# Patient Record
Sex: MALE | Race: WHITE | HISPANIC OR LATINO | Employment: FULL TIME | URBAN - METROPOLITAN AREA
[De-identification: names, ages, dates, MRNs, and addresses within clinical notes are randomized per-mention and may not be internally consistent; named-entity substitution may affect disease eponyms.]

---

## 2017-12-13 ENCOUNTER — CLINICAL SUPPORT (OUTPATIENT)
Dept: URGENT CARE | Facility: CLINIC | Age: 62
End: 2017-12-13

## 2017-12-13 DIAGNOSIS — Z02.83 ENCOUNTER FOR DRUG SCREENING: Primary | ICD-10-CM

## 2017-12-13 PROCEDURE — 80305 DRUG TEST PRSMV DIR OPT OBS: CPT | Mod: S$GLB,,, | Performed by: PREVENTIVE MEDICINE

## 2018-02-04 ENCOUNTER — NURSE TRIAGE (OUTPATIENT)
Dept: ADMINISTRATIVE | Facility: CLINIC | Age: 63
End: 2018-02-04

## 2018-02-04 PROCEDURE — 99283 EMERGENCY DEPT VISIT LOW MDM: CPT

## 2018-02-05 ENCOUNTER — HOSPITAL ENCOUNTER (EMERGENCY)
Facility: HOSPITAL | Age: 63
Discharge: HOME | End: 2018-02-05
Attending: EMERGENCY MEDICINE

## 2018-02-05 VITALS
OXYGEN SATURATION: 96 % | WEIGHT: 150 LBS | SYSTOLIC BLOOD PRESSURE: 130 MMHG | TEMPERATURE: 98 F | RESPIRATION RATE: 20 BRPM | HEIGHT: 66 IN | HEART RATE: 92 BPM | DIASTOLIC BLOOD PRESSURE: 79 MMHG | BODY MASS INDEX: 24.11 KG/M2

## 2018-02-05 DIAGNOSIS — M77.12 LATERAL EPICONDYLITIS OF LEFT ELBOW: ICD-10-CM

## 2018-02-05 DIAGNOSIS — B34.9 VIRAL SYNDROME: Primary | ICD-10-CM

## 2018-02-05 DIAGNOSIS — M54.12 CERVICAL RADICULOPATHY: ICD-10-CM

## 2018-02-05 LAB
FLUAV AG SPEC QL IA: NEGATIVE
FLUBV AG SPEC QL IA: NEGATIVE
SPECIMEN SOURCE: NORMAL

## 2018-02-05 PROCEDURE — 25000003 PHARM REV CODE 250: Performed by: EMERGENCY MEDICINE

## 2018-02-05 PROCEDURE — 87400 INFLUENZA A/B EACH AG IA: CPT

## 2018-02-05 RX ORDER — KETOROLAC TROMETHAMINE 10 MG/1
10 TABLET, FILM COATED ORAL
Status: COMPLETED | OUTPATIENT
Start: 2018-02-05 | End: 2018-02-05

## 2018-02-05 RX ORDER — CYCLOBENZAPRINE HCL 10 MG
10 TABLET ORAL
Status: COMPLETED | OUTPATIENT
Start: 2018-02-05 | End: 2018-02-05

## 2018-02-05 RX ORDER — CYCLOBENZAPRINE HCL 10 MG
10 TABLET ORAL 3 TIMES DAILY PRN
Qty: 15 TABLET | Refills: 0 | Status: SHIPPED | OUTPATIENT
Start: 2018-02-05 | End: 2018-02-10

## 2018-02-05 RX ORDER — ACETAMINOPHEN 500 MG
1000 TABLET ORAL
Status: COMPLETED | OUTPATIENT
Start: 2018-02-05 | End: 2018-02-05

## 2018-02-05 RX ADMIN — CYCLOBENZAPRINE HYDROCHLORIDE 10 MG: 10 TABLET, FILM COATED ORAL at 01:02

## 2018-02-05 RX ADMIN — ACETAMINOPHEN 1000 MG: 500 TABLET ORAL at 01:02

## 2018-02-05 RX ADMIN — KETOROLAC TROMETHAMINE 10 MG: 10 TABLET, FILM COATED ORAL at 01:02

## 2018-02-05 NOTE — TELEPHONE ENCOUNTER
"  Reason for Disposition   [1] Sinus pain (not just congestion) AND [2] fever    Answer Assessment - Initial Assessment Questions  1. LOCATION: "Where does it hurt?"       Around nose/cheeks and right ear  2. ONSET: "When did the sinus pain start?"  (e.g., hours, days)       A few days  3. SEVERITY: "How bad is the pain?"   (Scale 1-10; mild, moderate or severe)    - MILD (1-3): doesn't interfere with normal activities     - MODERATE (4-7): interferes with normal activities (e.g., work or school) or awakens from sleep    - SEVERE (8-10): excruciating pain and patient unable to do any normal activities         Mild to moderate  4. RECURRENT SYMPTOM: "Have you ever had sinus problems before?" If so, ask: "When was the last time?" and "What happened that time?"       Has hx of sinus infectikons  5. NASAL CONGESTION: "Is the nose blocked?" If so, ask, "Can you open it or must you breathe through the mouth?"      mild  6. NASAL DISCHARGE: "Do you have discharge from your nose?" If so ask, "What color?"      No -when he clears throat has green mucous production  7. FEVER: "Do you have a fever?" If so, ask: "What is it, how was it measured, and when did it start?"      Subjective-Feels hot/cold and then chills.  8. OTHER SYMPTOMS: "Do you have any other symptoms?" (e.g., sore throat, cough, earache, difficulty breathing)      Fatigue - noted what rash on chest/neck about 1630/1700 today- small red slightly raised bumps -no itching, has had feeling of pressure at temples for 4 or 5 days   9. PREGNANCY: "Is there any chance you are pregnant?" "When was your last menstrual period?"      N/a  Above sx's worsening this week    Protocols used: ST SINUS PAIN AND CONGESTION-A-AH    "

## 2018-02-05 NOTE — ED PROVIDER NOTES
Encounter Date: 2/4/2018    SCRIBE #1 NOTE: I, Abdi Maggy, am scribing for, and in the presence of,  Chepe Yadav MD. I have scribed the entire note.       History     Chief Complaint   Patient presents with    flu like symptoms     fever, chills, headache, sinus congestion, ear pain since yesterday. pt. also has macular/papular rash to upper chest and back x1 day. green sputum     This is a 63 y.o. male who has a history of HTN, GERD  The patient presents to the Emergency Department with myalgias and cough.   Symptoms are associated with fever, chills, rash  Pt also complains of left elbow pain radiating into the wrist  Pt denies nausea/vomtiing.   Symptoms are aggravated by movement  Symptoms are relieved by nothing.     Patient has no prior history of similar symptoms.   Pt has a past surgical history that includes Appendectomy.            Review of patient's allergies indicates:   Allergen Reactions    Vicodin [hydrocodone-acetaminophen]      Past Medical History:   Diagnosis Date    GERD (gastroesophageal reflux disease)     Hypertension     Prostate disease      Past Surgical History:   Procedure Laterality Date    APPENDECTOMY       No family history on file.  Social History   Substance Use Topics    Smoking status: Never Smoker    Smokeless tobacco: Not on file    Alcohol use Yes      Comment: occassional     Review of Systems   Constitutional: Positive for chills and fever.   HENT: Negative for sore throat.    Respiratory: Positive for cough. Negative for shortness of breath.    Cardiovascular: Negative for chest pain.   Gastrointestinal: Negative for nausea and vomiting.   Genitourinary: Negative for dysuria.   Musculoskeletal: Positive for arthralgias and myalgias. Negative for back pain.   Skin: Positive for rash.   Neurological: Negative for weakness.   Hematological: Does not bruise/bleed easily.       Physical Exam     Initial Vitals [02/04/18 2118]   BP Pulse Resp Temp SpO2   (!) 143/73 (!)  116 20 (!) 100.8 °F (38.2 °C) 97 %      MAP       96.33         Physical Exam    Nursing note and vitals reviewed.  Constitutional: He appears well-developed and well-nourished. He is not diaphoretic. No distress.   HENT:   Head: Normocephalic and atraumatic.   Mouth/Throat: Oropharynx is clear and moist.   Eyes: Conjunctivae are normal. Pupils are equal, round, and reactive to light.   Neck: Normal range of motion. Neck supple.   Cardiovascular: Normal rate, regular rhythm, normal heart sounds and intact distal pulses.   Pulmonary/Chest: Breath sounds normal. No respiratory distress. He has no wheezes. He has no rhonchi. He has no rales.   Abdominal: Soft. Bowel sounds are normal. He exhibits no distension. There is no tenderness.   Musculoskeletal: Normal range of motion. He exhibits no edema.   Tenderness over lateral epicondyle, tenderness over left trapezius muscle,   Lymphadenopathy:     He has no cervical adenopathy.   Neurological: He is alert and oriented to person, place, and time. He has normal strength.   Ain, PIN and ulnar nerves are intact,  strength is normal and equal,   Skin: Skin is warm and dry.   maculopapular rash to chest   Psychiatric: He has a normal mood and affect. Thought content normal.         ED Course   Procedures  Labs Reviewed   INFLUENZA A AND B ANTIGEN             Medical Decision Making:   Initial Assessment:   This is an emergent evaluation of a 63 y.o.male patient with presentation of rash, flu like symptoms, left elbow pain and radiating pain from the left neck.   Initial differentials include but are not limited to: lateral epicondylitis, strain, sprain, influenza, flu like illness, viral URI.  Plan: check rapid influenza. Give Toradol for pain. Discharge to follow with primary doctor.      ED Management:  Flu test is negative. Symptoms consistent with lateral epicondylitis and cervical radiculopathy.   Clinical impression and plan discussed with patient.    Pt is to call  for follow up with PCP in 7 days.  Pt to return to the ED for any new or concerning symptoms.  Aftercare instructions and return precautions provided to patient.   Pt expressed understanding and agrees with plan.                         ED Course      Clinical Impression:     1. Viral syndrome    2. Cervical radiculopathy    3. Lateral epicondylitis of left elbow          Disposition:   Disposition: Discharged  Condition: Stable       Scribe Attestation I, Dr. Chepe Yadav, personally performed the services described in this documentation.   All medical record entries made by the scribe were at my direction and in my presence.   I have reviewed the chart and agree that the record is accurate and complete.   Chepe Yadav MD.                    Chepe Yadav MD  02/05/18 3658

## 2018-12-13 ENCOUNTER — CLINICAL SUPPORT (OUTPATIENT)
Dept: URGENT CARE | Facility: CLINIC | Age: 63
End: 2018-12-13

## 2018-12-13 PROCEDURE — 99499 UNLISTED E&M SERVICE: CPT | Mod: S$GLB,,, | Performed by: NURSE PRACTITIONER

## 2019-11-24 ENCOUNTER — HOSPITAL ENCOUNTER (EMERGENCY)
Facility: HOSPITAL | Age: 64
Discharge: HOME | End: 2019-11-24
Attending: EMERGENCY MEDICINE
Payer: MEDICAID

## 2019-11-24 VITALS
DIASTOLIC BLOOD PRESSURE: 78 MMHG | SYSTOLIC BLOOD PRESSURE: 153 MMHG | OXYGEN SATURATION: 100 % | HEIGHT: 66 IN | WEIGHT: 145 LBS | TEMPERATURE: 98 F | HEART RATE: 77 BPM | BODY MASS INDEX: 23.3 KG/M2

## 2019-11-24 DIAGNOSIS — S62.639B OPEN FRACTURE OF TUFT OF DISTAL PHALANX OF FINGER: ICD-10-CM

## 2019-11-24 DIAGNOSIS — S61.315A LACERATION OF LEFT RING FINGER WITHOUT FOREIGN BODY WITH DAMAGE TO NAIL, INITIAL ENCOUNTER: Primary | ICD-10-CM

## 2019-11-24 PROCEDURE — 90715 TDAP VACCINE 7 YRS/> IM: CPT | Performed by: EMERGENCY MEDICINE

## 2019-11-24 PROCEDURE — 90471 IMMUNIZATION ADMIN: CPT | Performed by: EMERGENCY MEDICINE

## 2019-11-24 PROCEDURE — 63600175 PHARM REV CODE 636 W HCPCS: Performed by: EMERGENCY MEDICINE

## 2019-11-24 PROCEDURE — 12001 RPR S/N/AX/GEN/TRNK 2.5CM/<: CPT | Mod: F3

## 2019-11-24 PROCEDURE — 99284 EMERGENCY DEPT VISIT MOD MDM: CPT | Mod: 25

## 2019-11-24 PROCEDURE — 96365 THER/PROPH/DIAG IV INF INIT: CPT

## 2019-11-24 PROCEDURE — 25000003 PHARM REV CODE 250: Performed by: EMERGENCY MEDICINE

## 2019-11-24 PROCEDURE — 96372 THER/PROPH/DIAG INJ SC/IM: CPT | Mod: 59

## 2019-11-24 RX ORDER — CEFAZOLIN SODIUM 2 G/50ML
2 SOLUTION INTRAVENOUS
Status: COMPLETED | OUTPATIENT
Start: 2019-11-24 | End: 2019-11-24

## 2019-11-24 RX ORDER — MELOXICAM 7.5 MG/1
7.5 TABLET ORAL DAILY
Qty: 7 TABLET | Refills: 0 | Status: SHIPPED | OUTPATIENT
Start: 2019-11-24

## 2019-11-24 RX ORDER — LIDOCAINE HYDROCHLORIDE 10 MG/ML
10 INJECTION INFILTRATION; PERINEURAL
Status: COMPLETED | OUTPATIENT
Start: 2019-11-24 | End: 2019-11-24

## 2019-11-24 RX ORDER — OMEPRAZOLE 20 MG/1
20 CAPSULE, DELAYED RELEASE ORAL DAILY
Qty: 30 CAPSULE | Refills: 0 | Status: SHIPPED | OUTPATIENT
Start: 2019-11-24 | End: 2019-12-24

## 2019-11-24 RX ORDER — KETOROLAC TROMETHAMINE 30 MG/ML
30 INJECTION, SOLUTION INTRAMUSCULAR; INTRAVENOUS
Status: COMPLETED | OUTPATIENT
Start: 2019-11-24 | End: 2019-11-24

## 2019-11-24 RX ORDER — CEPHALEXIN 500 MG/1
500 CAPSULE ORAL 4 TIMES DAILY
Qty: 24 CAPSULE | Refills: 0 | Status: SHIPPED | OUTPATIENT
Start: 2019-11-25 | End: 2019-12-01

## 2019-11-24 RX ORDER — MORPHINE SULFATE 15 MG/1
15 TABLET ORAL EVERY 12 HOURS PRN
Qty: 6 TABLET | Refills: 0 | Status: SHIPPED | OUTPATIENT
Start: 2019-11-24

## 2019-11-24 RX ADMIN — LIDOCAINE HYDROCHLORIDE 10 ML: 10 INJECTION, SOLUTION INFILTRATION; PERINEURAL at 05:11

## 2019-11-24 RX ADMIN — KETOROLAC TROMETHAMINE 30 MG: 30 INJECTION, SOLUTION INTRAMUSCULAR at 05:11

## 2019-11-24 RX ADMIN — CEFAZOLIN SODIUM 2 G: 2 SOLUTION INTRAVENOUS at 06:11

## 2019-11-24 RX ADMIN — CLOSTRIDIUM TETANI TOXOID ANTIGEN (FORMALDEHYDE INACTIVATED), CORYNEBACTERIUM DIPHTHERIAE TOXOID ANTIGEN (FORMALDEHYDE INACTIVATED), BORDETELLA PERTUSSIS TOXOID ANTIGEN (GLUTARALDEHYDE INACTIVATED), BORDETELLA PERTUSSIS FILAMENTOUS HEMAGGLUTININ ANTIGEN (FORMALDEHYDE INACTIVATED), BORDETELLA PERTUSSIS PERTACTIN ANTIGEN, AND BORDETELLA PERTUSSIS FIMBRIAE 2/3 ANTIGEN 0.5 ML: 5; 2; 2.5; 5; 3; 5 INJECTION, SUSPENSION INTRAMUSCULAR at 05:11

## 2019-11-24 NOTE — ED NOTES
Patient present to ER with left middle and ring finger injury from being smashed by door today, left hand in bag of ice, 10/10 on pain scale

## 2019-11-24 NOTE — ED PROVIDER NOTES
Encounter Date: 11/24/2019    SCRIBE #1 NOTE: I, Franci Rivera, am scribing for, and in the presence of,  Dr. Catalan. I have scribed the entire note.       History     Chief Complaint   Patient presents with    Laceration     Pt had his hand caught in door- avulsions to left middle and ring fingers. Bleeding controlled.      Gunner Farr is a 64 y.o. male who  has a past medical history of GERD (gastroesophageal reflux disease), Hypertension, and Prostate disease.    The patient presents to the ED due to crushed fingers to the left hand. He reports onset of symptoms was just prior to arrival in the ED. He notes his finger were crushed by an acordion door at work. The patient sustained a wound to the left ring finger with bleeding that was controlled. He denies any numbness, tingling or weakness to the left hand. The patient is right hand dominant. His last Tetanus shot was greater 14 years ago.     The history is provided by the patient.     Review of patient's allergies indicates:   Allergen Reactions    Vicodin [hydrocodone-acetaminophen]      Past Medical History:   Diagnosis Date    GERD (gastroesophageal reflux disease)     Hypertension     Prostate disease      Past Surgical History:   Procedure Laterality Date    APPENDECTOMY       No family history on file.  Social History     Tobacco Use    Smoking status: Never Smoker   Substance Use Topics    Alcohol use: Yes     Comment: occassional    Drug use: No     Review of Systems   Constitutional: Negative for chills and fever.   HENT: Negative for congestion, ear pain, rhinorrhea and sore throat.    Respiratory: Negative for cough, shortness of breath and wheezing.    Cardiovascular: Negative for chest pain and palpitations.   Gastrointestinal: Negative for abdominal pain, diarrhea, nausea and vomiting.   Genitourinary: Negative for dysuria and hematuria.   Musculoskeletal: Negative for back pain, myalgias and neck pain.   Skin: Positive for wound  (left ring finger). Negative for rash.   Neurological: Negative for dizziness, weakness, light-headedness and headaches.   Psychiatric/Behavioral: Negative for confusion.       Physical Exam     Initial Vitals [11/24/19 1630]   BP Pulse Resp Temp SpO2   (!) 159/86 104 -- -- 100 %      MAP       --         Physical Exam    Nursing note and vitals reviewed.  Constitutional: He appears well-developed and well-nourished. He is not diaphoretic. No distress.   HENT:   Head: Normocephalic and atraumatic.   Mouth/Throat: Oropharynx is clear and moist.   Eyes: Conjunctivae and EOM are normal.   Neck: Normal range of motion. Neck supple.   Cardiovascular: Intact distal pulses.   Pulmonary/Chest: No respiratory distress.   Musculoskeletal: Normal range of motion. He exhibits tenderness. He exhibits no edema.   LUE; Able to range all fingers. Sensation intact to light touch. Ring finger with nailbed avulsion and subungual hematoma. Laceration to finger as shown.   Lymphadenopathy:     He has no cervical adenopathy.   Neurological: He is alert and oriented to person, place, and time. He has normal strength.   Skin: Skin is warm and dry. Capillary refill takes less than 2 seconds. No rash noted.                 ED Course   Lac Repair  Date/Time: 11/24/2019 7:00 PM  Performed by: Brandon Catalan Jr., MD  Authorized by: Brandon Catalan Jr., MD   Consent Done: Yes  Consent: Verbal consent obtained.  Risks and benefits: risks, benefits and alternatives were discussed  Consent given by: patient  Body area: upper extremity  Location details: left ring finger  Laceration length: 0.5 cm  Foreign bodies: no foreign bodies  Tendon involvement: none  Nerve involvement: none  Vascular damage: no  Anesthesia: digital block    Anesthesia:  Local anesthesia used: yes  Local Anesthetic: lidocaine 1% without epinephrine  Anesthetic total: 6 mL  Patient sedated: no  Preparation: Patient was prepped and draped in the usual sterile  fashion.  Irrigation solution: saline  Irrigation method: syringe  Amount of cleaning: extensive  Debridement: none  Degree of undermining: none  Skin closure: Ethilon (4-0)  Number of sutures: 3  Technique: simple (interrupted)  Approximation: close  Approximation difficulty: complex  Dressing: non-stick sterile dressing  Patient tolerance: Patient tolerated the procedure well with no immediate complications  Comments: Two sutures placed to laceration on finger tip, one placed through nail         Labs Reviewed - No data to display       Imaging Results          X-Ray Finger 2 or More Views Left (Final result)  Result time 11/24/19 17:18:35    Final result by Alec Wood MD (11/24/19 17:18:35)                 Impression:      Acute fracture of the 4th finger distal phalanx.      Electronically signed by: Alec Wood MD  Date:    11/24/2019  Time:    17:18             Narrative:    EXAMINATION:  XR FINGER 2 OR MORE VIEWS LEFT    CLINICAL HISTORY:  crush injury;    COMPARISON:  None    FINDINGS:  Acute mild displaced fracture is seen involving the distal tuft of the 4th finger distal phalanx.  No additional fractures or dislocation seen.                                 Medical Decision Making:   Differential Diagnosis:   Differential Diagnosis includes, but is not limited to:  Open fracture, vascular injury, tendon/ligament injury, nerve injury, retained foreign body, superficial laceration, abrasion.    Clinical Tests:   Radiological Study: Ordered and Reviewed  ED Management:  64 year old male with finger tip laceration and associated tuft fracture.     Given Ancef, tdap in ED    Will discharge on keflex for 7 days total of antibiotics    Will discharge with pain medication, patient reports allergy to hydrocodone, will prescribe morphine sulfate    Discussed with Dr. Moreno who recommended suturing through nail plate for increased stabilization. He will follow up with patient tomorrow.    Return  precautions for worsening pain, numbness, swelling, fever, or any other concerns.                    ED Course as of Nov 24 1859   Sun Nov 24, 2019   1804 Dr. Moreno has reviewed images recommends suturing and discharging on prophylactic antibiotics    [SP]      ED Course User Index  [SP] Franci Rivera                Clinical Impression:     1. Laceration of left ring finger without foreign body with damage to nail, initial encounter    2. Open fracture of tuft of distal phalanx of finger           Scribe attestation I, Brandon Catalan,  personally performed the services described in this documentation. All medical record entries made by the scribe were at my direction and in my presence.  I have reviewed the chart and agree that the record reflects my personal performance and is accurate and complete. Brandon Catalan M.D. 11:17 AM11/26/2019      Portions of this note were dictated using voice recognition software and may contain dictation related errors in spelling/grammar/syntax not found on text review                   Brandon Catalan Jr., MD  11/26/19 0361

## 2019-11-24 NOTE — ED NOTES
APPEARANCE: Alert, oriented and in no acute distress.  CARDIAC: Normal rate and rhythm, no murmur heard.   PERIPHERAL VASCULAR: peripheral pulses present. Normal cap refill. No edema. Warm to touch.    RESPIRATORY:Normal rate and effort, breath sounds clear bilaterally throughout chest. Respirations are equal and unlabored no obvious signs of distress.  GASTRO: soft, bowel sounds normal, no tenderness, no abdominal distention.  MUSC: Full ROM. No bony tenderness or soft tissue tenderness. No obvious deformity.  SKIN: Skin is warm and dry, normal skin turgor, mucous membranes moist. Laceration to left tip ring finger  NEURO: 5/5 strength major flexors/extensors bilaterally. Sensory intact to light touch bilaterally. [place] coma scale: eyes open spontaneously-4, oriented & converses-5, obeys commands-6. No neurological abnormalities.   MENTAL STATUS: awake, alert and aware of environment.  EYE: PERRL, both eyes: pupils brisk and reactive to light. Normal size.  ENT: EARS: no obvious drainage. NOSE: no active bleeding.

## 2019-11-25 ENCOUNTER — OFFICE VISIT (OUTPATIENT)
Dept: ORTHOPEDICS | Facility: CLINIC | Age: 64
End: 2019-11-25
Payer: MEDICAID

## 2019-11-25 ENCOUNTER — TELEPHONE (OUTPATIENT)
Dept: ORTHOPEDICS | Facility: CLINIC | Age: 64
End: 2019-11-25

## 2019-11-25 VITALS — HEIGHT: 66 IN | WEIGHT: 145 LBS | BODY MASS INDEX: 23.3 KG/M2

## 2019-11-25 DIAGNOSIS — S62.665D: ICD-10-CM

## 2019-11-25 PROCEDURE — 99999 PR PBB SHADOW E&M-EST. PATIENT-LVL II: ICD-10-PCS | Mod: PBBFAC,,, | Performed by: ORTHOPAEDIC SURGERY

## 2019-11-25 PROCEDURE — 99203 PR OFFICE/OUTPT VISIT, NEW, LEVL III, 30-44 MIN: ICD-10-PCS | Mod: S$PBB,,, | Performed by: ORTHOPAEDIC SURGERY

## 2019-11-25 PROCEDURE — 99999 PR PBB SHADOW E&M-EST. PATIENT-LVL II: CPT | Mod: PBBFAC,,, | Performed by: ORTHOPAEDIC SURGERY

## 2019-11-25 PROCEDURE — 99212 OFFICE O/P EST SF 10 MIN: CPT | Mod: PBBFAC,PN | Performed by: ORTHOPAEDIC SURGERY

## 2019-11-25 PROCEDURE — 99203 OFFICE O/P NEW LOW 30 MIN: CPT | Mod: S$PBB,,, | Performed by: ORTHOPAEDIC SURGERY

## 2019-11-25 RX ORDER — TRAMADOL HYDROCHLORIDE 50 MG/1
50 TABLET ORAL EVERY 4 HOURS PRN
Qty: 30 TABLET | Refills: 0 | Status: SHIPPED | OUTPATIENT
Start: 2019-11-25

## 2019-11-25 RX ORDER — TERAZOSIN 2 MG/1
2 CAPSULE ORAL NIGHTLY
COMMUNITY
End: 2023-06-26

## 2019-11-25 NOTE — DISCHARGE INSTRUCTIONS
[facility]  Phone: [phone]    If you would like to follow up with the [facility] for further care of your fracture, please call the [facility] Scheduling Department at [phone] during business hours. Please let the  know you need a fracture follow-up appointment with Orthopedics, and you will be scheduled in the Orthopedic Clinic. Expect your appointment to be scheduled within 1-2 weeks.

## 2019-11-25 NOTE — PROGRESS NOTES
Subjective:      Patient ID: Gunner Farr is a 64 y.o. male.    Chief Complaint: Consult and Finger Injury (Left ring )      HPI  Gunner Farr is a  64 y.o. male presenting today for left ring finger injury.  There was a history of trauma.  Onset of symptoms began yesterday when he smashed his left ring finger.  He was seen in the emergency room and [place] where he was noted to have an open tuft fracture repaired with sutures is here today in follow-up.      Review of patient's allergies indicates:   Allergen Reactions    Hydrocodone-acetaminophen Shortness Of Breath     Shortness of breath^  Shortness of breath^      Caffeine      Anxiety^difficulty breathing  Anxiety^difficulty breathing           Current Outpatient Medications   Medication Sig Dispense Refill    cephALEXin (KEFLEX) 500 MG capsule Take 1 capsule (500 mg total) by mouth 4 (four) times daily. for 6 days 24 capsule 0    fluticasone (FLONASE) 50 mcg/actuation nasal spray 1 spray by Each Nare route 2 (two) times daily as needed for Rhinitis. 15 g 0    ibuprofen (ADVIL,MOTRIN) 600 MG tablet Take 1 tablet (600 mg total) by mouth every 6 (six) hours as needed for Pain. 20 tablet 0    meloxicam (MOBIC) 7.5 MG tablet Take 1 tablet (7.5 mg total) by mouth once daily. 7 tablet 0    morphine (MSIR) 15 MG tablet Take 1 tablet (15 mg total) by mouth every 12 (twelve) hours as needed for Pain (break through pain). 6 tablet 0    omeprazole (PRILOSEC) 20 MG capsule Take 1 capsule (20 mg total) by mouth once daily. 30 capsule 0    terazosin (HYTRIN) 2 MG capsule Take 2 mg by mouth every evening.      atenolol (TENORMIN) 50 MG tablet Take 50 mg by mouth once daily.      diazepam (VALIUM) 5 MG tablet Take 1 tablet (5 mg total) by mouth every 8 (eight) hours. 15 tablet 0    tamsulosin (FLOMAX) 0.4 mg Cp24 Take 0.4 mg by mouth once daily.       No current facility-administered medications for this visit.        Past Medical History:   Diagnosis Date     "GERD (gastroesophageal reflux disease)     Hypertension     Prostate disease        Past Surgical History:   Procedure Laterality Date    APPENDECTOMY         Review of Systems:  ROS    OBJECTIVE:     PHYSICAL EXAM:  Height: 5' 6" (167.6 cm) Weight: 65.8 kg (145 lb)  Vitals:    11/25/19 1112   Weight: 65.8 kg (145 lb)   Height: 5' 6" (1.676 m)   PainSc: 10-Worst pain ever     Well developed, well nourished male in no acute distress  Alert and oriented x 3  HEENT- Normal exam  Lungs- Clear to auscultation  Heart- Regular rate and rhythm  Abdomen- Soft nontender  Extremity exam- examination left ring finger after removal of the bandage the tip looks good the nail plate is intact sutures in place slight swelling no significant deformity    RADIOGRAPHS:  AP lateral x-rays reviewed left ring finger demonstrate a minimally displaced tuft fracture at the tip  Comments: I have personally reviewed the imaging and I agree with the above radiologist's report.    ASSESSMENT/PLAN:     IMPRESSION:  Open tuft fracture crush injury left ring finger    PLAN:  Patient was instructed appropriate wound care including daily dressing changes  He should probably avoid soap and water for this week follow-up 1 week for recheck and suture removal  Continue antibiotics and pain medicine from the ER       - We talked at length about the anatomy and pathophysiology of No diagnosis found.        Disclaimer: This note has been generated using voice-recognition software. There may be typographical errors that have been missed during proof-reading.  "

## 2019-11-25 NOTE — TELEPHONE ENCOUNTER
----- Message from Francine Maradiaga sent at 11/25/2019  1:28 PM CST -----  Contact: self, [phone]  Patient states he needs a letter stating he can continue to work on light duty with one hand faxed to [phone].

## 2019-11-25 NOTE — TELEPHONE ENCOUNTER
----- Message from Brandon Moreno Jr., MD sent at 11/24/2019  9:50 PM CST -----  Please call this patient for appt with me or Faiza this week  Thx

## 2019-11-25 NOTE — TELEPHONE ENCOUNTER
----- Message from Jr Matthews sent at 11/25/2019  2:36 PM CST -----  Contact: Patient @ [phone]  Patient calling to get an update on the return to work with light duties notice, pls call

## 2019-12-02 ENCOUNTER — TELEPHONE (OUTPATIENT)
Dept: ORTHOPEDICS | Facility: CLINIC | Age: 64
End: 2019-12-02

## 2019-12-02 ENCOUNTER — OFFICE VISIT (OUTPATIENT)
Dept: ORTHOPEDICS | Facility: CLINIC | Age: 64
End: 2019-12-02
Payer: MEDICAID

## 2019-12-02 VITALS — HEIGHT: 66 IN | WEIGHT: 145 LBS | BODY MASS INDEX: 23.3 KG/M2

## 2019-12-02 DIAGNOSIS — S62.665D: Primary | ICD-10-CM

## 2019-12-02 PROCEDURE — 99999 PR PBB SHADOW E&M-EST. PATIENT-LVL III: CPT | Mod: PBBFAC,,, | Performed by: ORTHOPAEDIC SURGERY

## 2019-12-02 PROCEDURE — 99999 PR PBB SHADOW E&M-EST. PATIENT-LVL III: ICD-10-PCS | Mod: PBBFAC,,, | Performed by: ORTHOPAEDIC SURGERY

## 2019-12-02 PROCEDURE — 99213 PR OFFICE/OUTPT VISIT, EST, LEVL III, 20-29 MIN: ICD-10-PCS | Mod: S$PBB,,, | Performed by: ORTHOPAEDIC SURGERY

## 2019-12-02 PROCEDURE — 99213 OFFICE O/P EST LOW 20 MIN: CPT | Mod: S$PBB,,, | Performed by: ORTHOPAEDIC SURGERY

## 2019-12-02 PROCEDURE — 99213 OFFICE O/P EST LOW 20 MIN: CPT | Mod: PBBFAC,PN | Performed by: ORTHOPAEDIC SURGERY

## 2019-12-02 RX ORDER — TRAMADOL HYDROCHLORIDE 50 MG/1
50 TABLET ORAL EVERY 6 HOURS PRN
Qty: 30 TABLET | Refills: 0 | Status: SHIPPED | OUTPATIENT
Start: 2019-12-02 | End: 2019-12-12

## 2019-12-02 NOTE — TELEPHONE ENCOUNTER
Left message for Rachelle to return call regarding if this is workers comp she needs to contact them workers comp office at [phone].

## 2019-12-02 NOTE — TELEPHONE ENCOUNTER
----- Message from Mary Grace Arias sent at 12/2/2019 12:42 PM CST -----  Contact: Rachelle @ [facility] [phone]  Rachelle is requesting office visit notes from 11/25 appt, be sent to FAX# [phone]. Please advise.

## 2019-12-17 ENCOUNTER — TELEPHONE (OUTPATIENT)
Dept: ORTHOPEDICS | Facility: CLINIC | Age: 64
End: 2019-12-17

## 2019-12-17 NOTE — TELEPHONE ENCOUNTER
----- Message from Yao Patton sent at 12/17/2019 10:58 AM CST -----  Contact: [phone]/self   Patient calling to speak with you about rescheduling his appointment.  Patient also requesting a letter saying he has to be on light duty until his next appointment. Patient states he need the letter before 1pm     Please call back to assist at [phone]

## 2019-12-19 ENCOUNTER — TELEPHONE (OUTPATIENT)
Dept: ORTHOPEDICS | Facility: CLINIC | Age: 64
End: 2019-12-19

## 2019-12-19 NOTE — TELEPHONE ENCOUNTER
----- Message from Lissa Francis sent at 12/19/2019 11:25 AM CST -----  Contact: [phone]  Patient needs a letter faxed to his employer about his missed appt and his new appt. Send to [phone]. He is requesting to speak with Maribel. Please advise.

## 2019-12-19 NOTE — TELEPHONE ENCOUNTER
----- Message from Lissa Francis sent at 12/19/2019 11:25 AM CST -----  Contact: [phone]  Patient needs a letter faxed to his employer about his missed appt and his new appt. Send to [phone]. He is requesting to speak with Maribel. Please advise.

## 2019-12-19 NOTE — TELEPHONE ENCOUNTER
----- Message from Uriah Branham sent at 12/19/2019 11:41 AM CST -----  Contact: [phone]/ self   Patient called in returning your call. Please advise.

## 2020-01-03 ENCOUNTER — TELEPHONE (OUTPATIENT)
Dept: ORTHOPEDICS | Facility: CLINIC | Age: 65
End: 2020-01-03

## 2020-01-03 DIAGNOSIS — S62.605B: Primary | ICD-10-CM

## 2020-01-07 ENCOUNTER — HOSPITAL ENCOUNTER (OUTPATIENT)
Dept: RADIOLOGY | Facility: HOSPITAL | Age: 65
Discharge: HOME | End: 2020-01-07
Attending: ORTHOPAEDIC SURGERY
Payer: MEDICAID

## 2020-01-07 ENCOUNTER — OFFICE VISIT (OUTPATIENT)
Dept: ORTHOPEDICS | Facility: CLINIC | Age: 65
End: 2020-01-07
Payer: MEDICAID

## 2020-01-07 DIAGNOSIS — S62.668D: Primary | ICD-10-CM

## 2020-01-07 DIAGNOSIS — S62.605B: ICD-10-CM

## 2020-01-07 PROCEDURE — 99999 PR PBB SHADOW E&M-EST. PATIENT-LVL III: ICD-10-PCS | Mod: PBBFAC,,, | Performed by: ORTHOPAEDIC SURGERY

## 2020-01-07 PROCEDURE — 99213 OFFICE O/P EST LOW 20 MIN: CPT | Mod: PBBFAC,25,PN | Performed by: ORTHOPAEDIC SURGERY

## 2020-01-07 PROCEDURE — 73120 X-RAY EXAM OF HAND: CPT | Mod: 26,LT,, | Performed by: RADIOLOGY

## 2020-01-07 PROCEDURE — 99213 OFFICE O/P EST LOW 20 MIN: CPT | Mod: S$PBB,,, | Performed by: ORTHOPAEDIC SURGERY

## 2020-01-07 PROCEDURE — 73120 XR HAND 2 VIEW LEFT: ICD-10-PCS | Mod: 26,LT,, | Performed by: RADIOLOGY

## 2020-01-07 PROCEDURE — 99999 PR PBB SHADOW E&M-EST. PATIENT-LVL III: CPT | Mod: PBBFAC,,, | Performed by: ORTHOPAEDIC SURGERY

## 2020-01-07 PROCEDURE — 99213 PR OFFICE/OUTPT VISIT, EST, LEVL III, 20-29 MIN: ICD-10-PCS | Mod: S$PBB,,, | Performed by: ORTHOPAEDIC SURGERY

## 2020-01-07 PROCEDURE — 73120 X-RAY EXAM OF HAND: CPT | Mod: TC,PN,LT

## 2020-01-07 NOTE — PROGRESS NOTES
Subjective:      Patient ID: Gunner Farr is a 65 y.o. male.  Chief Complaint: Finger Pain (left ring finger)      HPI  Gunner Farr is a  65 y.o. male presenting today for follow up of open fracture left ring finger.  He reports that he is improving now about 6 weeks out from injury but he is still reporting some sensitivity and stiffness in the finger.    Review of patient's allergies indicates:   Allergen Reactions    Hydrocodone-acetaminophen Shortness Of Breath     Shortness of breath^  Shortness of breath^      Caffeine      Anxiety^difficulty breathing  Anxiety^difficulty breathing           Current Outpatient Medications   Medication Sig Dispense Refill    atenolol (TENORMIN) 50 MG tablet Take 50 mg by mouth once daily.      fluticasone (FLONASE) 50 mcg/actuation nasal spray 1 spray by Each Nare route 2 (two) times daily as needed for Rhinitis. 15 g 0    ibuprofen (ADVIL,MOTRIN) 600 MG tablet Take 1 tablet (600 mg total) by mouth every 6 (six) hours as needed for Pain. 20 tablet 0    meloxicam (MOBIC) 7.5 MG tablet Take 1 tablet (7.5 mg total) by mouth once daily. 7 tablet 0    morphine (MSIR) 15 MG tablet Take 1 tablet (15 mg total) by mouth every 12 (twelve) hours as needed for Pain (break through pain). 6 tablet 0    tamsulosin (FLOMAX) 0.4 mg Cp24 Take 0.4 mg by mouth once daily.      terazosin (HYTRIN) 2 MG capsule Take 2 mg by mouth every evening.      traMADol (ULTRAM) 50 mg tablet Take 1 tablet (50 mg total) by mouth every 4 (four) hours as needed for Pain. 30 tablet 0    diazepam (VALIUM) 5 MG tablet Take 1 tablet (5 mg total) by mouth every 8 (eight) hours. 15 tablet 0    omeprazole (PRILOSEC) 20 MG capsule Take 1 capsule (20 mg total) by mouth once daily. 30 capsule 0     No current facility-administered medications for this visit.        Past Medical History:   Diagnosis Date    GERD (gastroesophageal reflux disease)     Hypertension     Prostate disease        Past Surgical  History:   Procedure Laterality Date    APPENDECTOMY         OBJECTIVE:   PHYSICAL EXAM:       Vitals:    01/07/20 1048   PainSc:   5     Ortho/SPM Exam  Examination left hand the tip is well healed there is some mild sensitivity slight swelling and range of motion slightly decreased  Tendon function intact    RADIOGRAPHS:  None  Comments: I have personally reviewed the imaging and I agree with the above radiologist's report.    ASSESSMENT/PLAN:     IMPRESSION:  Status post injury left ring finger with slight stiffness    PLAN:  I have ordered some OT left hand to work on range of motion and strengthening  Advil or Motrin by mouth      FOLLOW UP:  3-4 weeks    Disclaimer: This note has been generated using voice-recognition software. There may be typographical errors that have been missed during proof-reading.

## 2020-02-04 ENCOUNTER — OFFICE VISIT (OUTPATIENT)
Dept: ORTHOPEDICS | Facility: CLINIC | Age: 65
End: 2020-02-04

## 2020-02-04 VITALS — BODY MASS INDEX: 23.3 KG/M2 | HEIGHT: 66 IN | WEIGHT: 145 LBS

## 2020-02-04 DIAGNOSIS — S62.665D: Primary | ICD-10-CM

## 2020-02-04 PROCEDURE — 99213 OFFICE O/P EST LOW 20 MIN: CPT | Mod: PBBFAC,PN | Performed by: ORTHOPAEDIC SURGERY

## 2020-02-04 PROCEDURE — 99999 PR PBB SHADOW E&M-EST. PATIENT-LVL III: CPT | Mod: PBBFAC,,, | Performed by: ORTHOPAEDIC SURGERY

## 2020-02-04 PROCEDURE — 99499 UNLISTED E&M SERVICE: CPT | Mod: S$PBB,,, | Performed by: ORTHOPAEDIC SURGERY

## 2020-02-04 PROCEDURE — 99999 PR PBB SHADOW E&M-EST. PATIENT-LVL III: ICD-10-PCS | Mod: PBBFAC,,, | Performed by: ORTHOPAEDIC SURGERY

## 2020-02-04 PROCEDURE — 99499 NO LOS: ICD-10-PCS | Mod: S$PBB,,, | Performed by: ORTHOPAEDIC SURGERY

## 2020-02-04 RX ORDER — METHOCARBAMOL 750 MG/1
500 TABLET, FILM COATED ORAL 4 TIMES DAILY
COMMUNITY

## 2020-02-04 NOTE — PROGRESS NOTES
Subjective:      Patient ID: Gunner Farr is a 65 y.o. male.  Chief Complaint: Follow-up of the Left Hand      HPI  Gunner Farr is a  65 y.o. male presenting today for follow up of open fracture of the left ring finger.  He reports that he is doing well no problems reported he feels ready to be released full duty work without pain.    Review of patient's allergies indicates:   Allergen Reactions    Hydrocodone-acetaminophen Shortness Of Breath     Shortness of breath^  Shortness of breath^      Caffeine      Anxiety^difficulty breathing  Anxiety^difficulty breathing           Current Outpatient Medications   Medication Sig Dispense Refill    atenolol (TENORMIN) 50 MG tablet Take 50 mg by mouth once daily.      fluticasone (FLONASE) 50 mcg/actuation nasal spray 1 spray by Each Nare route 2 (two) times daily as needed for Rhinitis. 15 g 0    ibuprofen (ADVIL,MOTRIN) 600 MG tablet Take 1 tablet (600 mg total) by mouth every 6 (six) hours as needed for Pain. 20 tablet 0    methocarbamol (ROBAXIN) 750 MG Tab Take 500 mg by mouth 4 (four) times daily.      tamsulosin (FLOMAX) 0.4 mg Cp24 Take 0.4 mg by mouth once daily.      terazosin (HYTRIN) 2 MG capsule Take 2 mg by mouth every evening.      diazepam (VALIUM) 5 MG tablet Take 1 tablet (5 mg total) by mouth every 8 (eight) hours. 15 tablet 0    meloxicam (MOBIC) 7.5 MG tablet Take 1 tablet (7.5 mg total) by mouth once daily. (Patient not taking: Reported on 2/4/2020) 7 tablet 0    morphine (MSIR) 15 MG tablet Take 1 tablet (15 mg total) by mouth every 12 (twelve) hours as needed for Pain (break through pain). (Patient not taking: Reported on 2/4/2020) 6 tablet 0    omeprazole (PRILOSEC) 20 MG capsule Take 1 capsule (20 mg total) by mouth once daily. 30 capsule 0    traMADol (ULTRAM) 50 mg tablet Take 1 tablet (50 mg total) by mouth every 4 (four) hours as needed for Pain. (Patient not taking: Reported on 2/4/2020) 30 tablet 0     No current  "facility-administered medications for this visit.        Past Medical History:   Diagnosis Date    GERD (gastroesophageal reflux disease)     Hypertension     Prostate disease        Past Surgical History:   Procedure Laterality Date    APPENDECTOMY         OBJECTIVE:   PHYSICAL EXAM:  Height: 5' 6" (167.6 cm) Weight: 65.8 kg (145 lb)  Vitals:    02/04/20 1111   Weight: 65.8 kg (145 lb)   Height: 5' 6" (1.676 m)   PainSc: 0-No pain     Ortho/SPM Exam  Examination left ring finger the tip looks well-healed no swelling no tenderness  Full range of motion good strength    RADIOGRAPHS:  None  Comments: I have personally reviewed the imaging and I agree with the above radiologist's report.    ASSESSMENT/PLAN:     IMPRESSION:  Status post open fracture left ring finger tip    PLAN:  Return to full activities including full duty work    FOLLOW UP:  As needed only    Disclaimer: This note has been generated using voice-recognition software. There may be typographical errors that have been missed during proof-reading.  "

## 2020-07-02 ENCOUNTER — OFFICE VISIT (OUTPATIENT)
Dept: URGENT CARE | Facility: CLINIC | Age: 65
End: 2020-07-02
Payer: MEDICAID

## 2020-07-02 VITALS
TEMPERATURE: 100 F | BODY MASS INDEX: 24.11 KG/M2 | SYSTOLIC BLOOD PRESSURE: 128 MMHG | RESPIRATION RATE: 16 BRPM | HEIGHT: 66 IN | DIASTOLIC BLOOD PRESSURE: 77 MMHG | OXYGEN SATURATION: 99 % | HEART RATE: 97 BPM | WEIGHT: 150 LBS

## 2020-07-02 DIAGNOSIS — M79.10 MUSCLE PAIN: ICD-10-CM

## 2020-07-02 DIAGNOSIS — R50.9 FEVER: ICD-10-CM

## 2020-07-02 DIAGNOSIS — R05.9 COUGH: ICD-10-CM

## 2020-07-02 DIAGNOSIS — Z03.818 ENCOUNTER FOR OBSERVATION FOR SUSPECTED EXPOSURE TO OTHER BIOLOGICAL AGENTS RULED OUT: ICD-10-CM

## 2020-07-02 DIAGNOSIS — R05.9 COUGH IN ADULT: Primary | ICD-10-CM

## 2020-07-02 PROCEDURE — 99213 PR OFFICE/OUTPT VISIT, EST, LEVL III, 20-29 MIN: ICD-10-PCS | Mod: S$GLB,,, | Performed by: FAMILY MEDICINE

## 2020-07-02 PROCEDURE — U0003 INFECTIOUS AGENT DETECTION BY NUCLEIC ACID (DNA OR RNA); SEVERE ACUTE RESPIRATORY SYNDROME CORONAVIRUS 2 (SARS-COV-2) (CORONAVIRUS DISEASE [COVID-19]), AMPLIFIED PROBE TECHNIQUE, MAKING USE OF HIGH THROUGHPUT TECHNOLOGIES AS DESCRIBED BY CMS-2020-01-R: HCPCS

## 2020-07-02 PROCEDURE — 99213 OFFICE O/P EST LOW 20 MIN: CPT | Mod: S$GLB,,, | Performed by: FAMILY MEDICINE

## 2020-07-02 RX ORDER — GUAIFENESIN 600 MG/1
600 TABLET, EXTENDED RELEASE ORAL 2 TIMES DAILY
Qty: 14 TABLET | Refills: 2 | Status: SHIPPED | OUTPATIENT
Start: 2020-07-02 | End: 2020-07-09

## 2020-07-02 RX ORDER — LORATADINE 10 MG/1
10 TABLET ORAL DAILY
Qty: 30 TABLET | Refills: 2 | Status: SHIPPED | OUTPATIENT
Start: 2020-07-02 | End: 2021-07-02

## 2020-07-02 NOTE — PROGRESS NOTES
"Subjective:       Patient ID: Gunner Farr is a 65 y.o. male.    Vitals:  height is 5' 6" (1.676 m) and weight is 68 kg (150 lb). His temperature is 99.5 °F (37.5 °C). His blood pressure is 128/77 and his pulse is 97. His respiration is 16 and oxygen saturation is 99%.     Chief Complaint: Cough    64 y/o male with c/o cough, headache, stopped ear, sore throat and sinus congestion and body ache, x 4 days,  No known covid exposure    Cough  This is a new problem. The current episode started in the past 7 days (4-5 days). The problem has been gradually worsening. The problem occurs every few minutes. The cough is productive of sputum (2 days ago). Associated symptoms include chills, postnasal drip and sweats. Pertinent negatives include no ear pain, eye redness, fever, hemoptysis, myalgias, rash, sore throat, shortness of breath or wheezing. The symptoms are aggravated by lying down. Treatments tried: mucinex. The treatment provided mild relief. His past medical history is significant for bronchitis.       Constitution: Positive for chills and fatigue. Negative for sweating and fever.   HENT: Positive for congestion and postnasal drip. Negative for ear pain, sinus pain, sinus pressure, sore throat and voice change.    Neck: Negative for painful lymph nodes.   Eyes: Negative for eye redness.   Respiratory: Negative for chest tightness, cough, sputum production, bloody sputum, COPD, shortness of breath, stridor, wheezing and asthma.    Gastrointestinal: Negative for nausea and vomiting.   Musculoskeletal: Negative for muscle ache.   Skin: Negative for rash.   Allergic/Immunologic: Negative for seasonal allergies and asthma.   Hematologic/Lymphatic: Negative for swollen lymph nodes.       Objective:      Physical Exam   Constitutional: He is oriented to person, place, and time. He appears well-developed. He is cooperative.  Non-toxic appearance. He does not appear ill. No distress.   HENT:   Head: Normocephalic and " atraumatic.   Right Ear: Hearing, tympanic membrane, external ear and ear canal normal.   Left Ear: Hearing, tympanic membrane, external ear and ear canal normal.      Comments: Clear rhinorrhea    Nose: Nose normal. No mucosal edema, rhinorrhea or nasal deformity. No epistaxis. Right sinus exhibits no maxillary sinus tenderness and no frontal sinus tenderness. Left sinus exhibits no maxillary sinus tenderness and no frontal sinus tenderness.   Mouth/Throat: Uvula is midline, oropharynx is clear and moist and mucous membranes are normal. No trismus in the jaw. Normal dentition. No uvula swelling. No posterior oropharyngeal erythema.   Eyes: Conjunctivae and lids are normal. Right eye exhibits no discharge. Left eye exhibits no discharge. No scleral icterus.   Neck: Trachea normal, normal range of motion, full passive range of motion without pain and phonation normal. Neck supple.   Cardiovascular: Normal rate, regular rhythm, normal heart sounds and normal pulses.   Pulmonary/Chest: Effort normal and breath sounds normal. No respiratory distress.   Abdominal: Soft. Normal appearance and bowel sounds are normal. He exhibits no distension, no pulsatile midline mass and no mass. There is no abdominal tenderness.   Musculoskeletal: Normal range of motion.         General: No deformity.   Neurological: He is alert and oriented to person, place, and time. He exhibits normal muscle tone. Coordination normal.   Skin: Skin is warm, dry, intact, not diaphoretic and not pale.   Psychiatric: His speech is normal and behavior is normal. Judgment and thought content normal.   Nursing note and vitals reviewed.        Assessment:       No diagnosis found.    Plan:         There are no diagnoses linked to this encounter.

## 2020-07-03 NOTE — PATIENT INSTRUCTIONS

## 2020-07-05 LAB — SARS-COV-2 RNA RESP QL NAA+PROBE: DETECTED

## 2020-07-06 ENCOUNTER — TELEPHONE (OUTPATIENT)
Dept: URGENT CARE | Facility: CLINIC | Age: 65
End: 2020-07-06

## 2020-07-06 NOTE — TELEPHONE ENCOUNTER
Returned pt's phone call. Notified pt of positive COVID-19 test result.  Pt verbalizes understanding, and states his symptoms have mildly improved but is still experiencing intermittent diarrhea, body aches, and headache. Advised on importance of rest and hydration with electrolyte solution. Advised strict ER precautions should he develop SOB or chest pain. Pt verbalizes understanding. All questions and concerns answered to pt's satisfaction.    Your test was POSITIVE for COVID-19 (coronavirus).       Prevention steps for patients with confirmed COVID-19       Stay home and stay away from family members and friends. The CDC says, you can leave home after these three things have happened: 1) You have had no fever for at least 72 hours (that is three full days of no fever without the use of medicine that reduces fevers) 2) AND other symptoms have improved (for example, when your cough or shortness of breath have improved) 3) AND at least 10 days have passed since your symptoms first appeared.   Separate yourself from other people and animals in your home.   Call ahead before visiting your doctor.   Wear a facemask.   Cover your coughs and sneezes.   Wash your hands often with soap and water; hand  can be used, too.   Avoid sharing personal household items.   Wipe down surfaces used daily.   Monitor your symptoms. Seek prompt medical attention if your illness is worsening (e.g., difficulty breathing).    Before seeking care, call your healthcare provider.   If you have a medical emergency and need to call 911, notify the dispatch personnel that you have, or are being evaluated for COVID-19. If possible, put on a facemask before emergency medical services arrive.        Recommended precautions for household members, intimate partners, and caregivers in a home setting of a patient with symptomatic laboratory-confirmed COVID-19 or a patient under investigation.  Household members, intimate partners,  and caregivers in the home setting awaiting tests results have close contact with a person with symptomatic, laboratory-confirmed COVID-19 or a person under investigation. Close contacts should monitor their health; they should call their provider right away if they develop symptoms suggestive of COVID-19 (e.g., fever, cough, shortness of breath).    Close contacts should also follow these recommendations:   Make sure that you understand and can help the patient follow their provider's instructions for medication(s) and care. You should help the patient with basic needs in the home and provide support for getting groceries, prescriptions, and other personal needs.   Monitor the patient's symptoms. If the patient is getting sicker, call his or her healthcare provider and tell them that the patient has laboratory-confirmed COVID-19. If the patient has a medical emergency and you need to call 911, notify the dispatch personnel that the patient has, or is being evaluated for COVID-19.   Household members should stay in another room or be  from the patient. Household members should use a separate bedroom and bathroom, if available.   Prohibit visitors.   Household members should care for any pets in the home.   Make sure that shared spaces in the home have good air flow, such as by an air conditioner or an opened window, weather permitting.   Perform hand hygiene frequently. Wash your hands often with soap and water for at least 20 seconds or use an alcohol-based hand  (that contains > 60% alcohol) covering all surfaces of your hands and rubbing them together until they feel dry. Soap and water should be used preferentially.   Avoid touching your eyes, nose, and mouth.   The patient should wear a facemask. If the patient is not able to wear a facemask (for example, because it causes trouble breathing), caregivers should wear a mask when they are in the same room as the patient.   Wear a  disposable facemask and gloves when you touch or have contact with the patient's blood, stool, or body fluids, such as saliva, sputum, nasal mucus, vomit, urine.  o Throw out disposable facemasks and gloves after using them. Do not reuse.  o When removing personal protective equipment, first remove and dispose of gloves. Then, immediately clean your hands with soap and water or alcohol-based hand . Next, remove and dispose of facemask, and immediately clean your hands again with soap and water or alcohol-based hand .   You should not share dishes, drinking glasses, cups, eating utensils, towels, bedding, or other items with the patient. After the patient uses these items, you should wash them thoroughly (see below Wash laundry thoroughly).   Clean all high-touch surfaces, such as counters, tabletops, doorknobs, bathroom fixtures, toilets, phones, keyboards, tablets, and bedside tables, every day. Also, clean any surfaces that may have blood, stool, or body fluids on them.   Use a household cleaning spray or wipe, according to the label instructions. Labels contain instructions for safe and effective use of the cleaning product including precautions you should take when applying the product, such as wearing gloves and making sure you have good ventilation during use of the product.   Wash laundry thoroughly.  o Immediately remove and wash clothes or bedding that have blood, stool, or body fluids on them.  o Wear disposable gloves while handling soiled items and keep soiled items away from your body. Clean your hands (with soap and water or an alcohol-based hand ) immediately after removing your gloves.  o Read and follow directions on labels of laundry or clothing items and detergent. In general, using a normal laundry detergent according to washing machine instructions and dry thoroughly using the warmest temperatures recommended on the clothing label.   Place all used disposable  gloves, facemasks, and other contaminated items in a lined container before disposing of them with other household waste. Clean your hands (with soap and water or an alcohol-based hand ) immediately after handling these items. Soap and water should be used preferentially if hands are visibly dirty.   Discuss any additional questions with your state or local health department or healthcare provider. Check available hours when contacting your local health department.    For more information see CDC link below.      https://www.cdc.gov/coronavirus/2019-ncov/hcp/guidance-prevent-spread.html#precautions        Sources:  CDC, [place] [facility] and [facility]     Sincerely,     Desirae Pierson NP

## 2020-10-30 ENCOUNTER — CLINICAL SUPPORT (OUTPATIENT)
Dept: URGENT CARE | Facility: CLINIC | Age: 65
End: 2020-10-30

## 2020-10-30 DIAGNOSIS — Z02.89 ENCOUNTER FOR EXAMINATION REQUIRED BY DEPARTMENT OF TRANSPORTATION (DOT): Primary | ICD-10-CM

## 2020-10-30 PROCEDURE — 99499 PHYSICAL, RECERT DOT/CDL: ICD-10-PCS | Mod: S$GLB,,, | Performed by: PHYSICIAN ASSISTANT

## 2020-10-30 PROCEDURE — 99499 UNLISTED E&M SERVICE: CPT | Mod: S$GLB,,, | Performed by: PHYSICIAN ASSISTANT

## 2022-12-07 ENCOUNTER — TELEPHONE (OUTPATIENT)
Dept: UROLOGY | Facility: CLINIC | Age: 67
End: 2022-12-07
Payer: OTHER GOVERNMENT

## 2022-12-07 NOTE — TELEPHONE ENCOUNTER
----- Message from Momo Luna MA sent at 12/7/2022 12:23 PM CST -----  Contact: [phone]  Patient is calling to schedule appt for BPH. He states he is off on 12/12/22, 12/15/22, and 12/16/22,  Pt access tried but no sooner appts are available.  the patient can be reached at [phone]

## 2022-12-16 ENCOUNTER — TELEPHONE (OUTPATIENT)
Dept: UROLOGY | Facility: CLINIC | Age: 67
End: 2022-12-16
Payer: OTHER GOVERNMENT

## 2022-12-16 NOTE — TELEPHONE ENCOUNTER
Spoke to the pt and informed him that due to his insurance, there are only a few locations that he would be able to go to. Pt decided he would go to [place] to be seen sooner. Pt was instructed to still get referral from [facility] and that he could send it to our fax to be scanned in to his chart.    ----- Message from Laurita Torres sent at 12/16/2022 12:01 PM CST -----  Regarding: self .[phone]  .Type: Patient Call Back    Who called: self    What is the request in detail:v Pt is requesting to speak with Aura in regards to appt     Can the clinic reply by [facility]? Call back    Would the patient rather a call back or a response via [facility]?  Call back  Best call back number: .[phone]

## 2023-01-26 ENCOUNTER — OFFICE VISIT (OUTPATIENT)
Dept: UROLOGY | Facility: CLINIC | Age: 68
End: 2023-01-26
Payer: MEDICARE

## 2023-01-26 VITALS
BODY MASS INDEX: 23.95 KG/M2 | HEART RATE: 83 BPM | WEIGHT: 149.06 LBS | SYSTOLIC BLOOD PRESSURE: 135 MMHG | HEIGHT: 66 IN | DIASTOLIC BLOOD PRESSURE: 69 MMHG

## 2023-01-26 DIAGNOSIS — N40.1 BPH WITH URINARY OBSTRUCTION: Primary | ICD-10-CM

## 2023-01-26 DIAGNOSIS — N13.8 BPH WITH URINARY OBSTRUCTION: Primary | ICD-10-CM

## 2023-01-26 PROCEDURE — 99204 OFFICE O/P NEW MOD 45 MIN: CPT | Mod: S$GLB,,, | Performed by: UROLOGY

## 2023-01-26 PROCEDURE — 99999 PR PBB SHADOW E&M-EST. PATIENT-LVL III: ICD-10-PCS | Mod: PBBFAC,,, | Performed by: UROLOGY

## 2023-01-26 PROCEDURE — 99999 PR PBB SHADOW E&M-EST. PATIENT-LVL III: CPT | Mod: PBBFAC,,, | Performed by: UROLOGY

## 2023-01-26 PROCEDURE — 99213 OFFICE O/P EST LOW 20 MIN: CPT | Mod: PBBFAC,PO | Performed by: UROLOGY

## 2023-01-26 PROCEDURE — 99204 PR OFFICE/OUTPT VISIT, NEW, LEVL IV, 45-59 MIN: ICD-10-PCS | Mod: S$GLB,,, | Performed by: UROLOGY

## 2023-01-26 NOTE — PATIENT INSTRUCTIONS
Holmium laser enucleation of the prostate (HoLEP): procedure-specific information    What is the evidence base for this information?  This leaflet includes advice from the [employer] guidelines in the management of benign prostate enlargement (BPH). It is therefore reflective of best practice in the care of this condition.  It is intended to supplement any advice you may already have been given by your urologist or other healthcare professionals. Alternative treatments are outlined below and can be discussed in more detail with your Urologist or Nurse Practitioner.    What does the procedure involve?  This operation involves the telescopic removal of obstructing prostate tissue using a laser and temporary insertion of a catheter.    What are the alternatives to this procedure?  Drugs, use of a catheter/stent, observation, conventional transurethral resection, other lasers that vaporize the prostate tissue or an open or robotic/laparoscopic operation.          What should I expect before the procedure?  If you are taking Clopidogrel or other new generation platelet inhibitors such as Pradaxa or Elaquis, on a regular basis, you must stop 10 days before your admission. This drug can cause increased bleeding after prostate surgery. Please inform your urologist if you have been instructed to not stop blood-thinning medications for any reason or if you have had cardiac stent placement within the last year. Treatment can be re-started safely about 10 days after you get home. If you are taking Warfarin to thin your blood, you should ensure that the Urology staff are aware of this well in advance of your admission.    If you live locally, you will usually be able to be discharged home on the day of your surgery.  If you have travelled from outside the local area for surgery, typically you will be kept in hospital overnight one night. If you are admitted to hospital after surgery, you will be seen by  members of the medical team, which include your surgeon, the urology residents or house officer and your nurse.    You will be asked not to eat or drink any solid food for 8 hours before surgery and, immediately before the operation, you may be given a pre-medication by the anesthetist which will make you dry-mouthed and pleasantly sleepy.    Please be sure to inform your Urologist in advance of your surgery if you have any of the following:    an artificial heart valve  a coronary artery stent  a heart pacemaker or defibrillator  an artificial joint placed less than 2 years prior to your HoLEP  an artificial blood vessel graft  a neurosurgical shunt  any other implanted foreign body  a prescription for Warfarin, Aspirin or Clopidogrel (Plavix®) or other blood thinners  a previous or current MRSA infection    What happens during the procedure?  A general anesthetic (where you will be asleep throughout the procedure) will be used most often. All methods minimize pain; your anesthetist will explain the pros and cons of each type of anesthetic to you. The operation, on average, takes  minutes, depending on the size of your prostate.    You will usually be given an injectable antibiotic before the procedure after checking for any drug allergies.    The laser is used to separate the obstructing prostate tissue from its surrounding capsule and to push it in large chunks into the bladder. An instrument (morcellator) is then used through the telescope to remove the prostate tissue from the bladder. A catheter is normally left to drain the bladder at the end of the procedure.                        What happens immediately after the procedure?  There is always some bleeding from the prostate area after the operation. The urine is usually clear of blood within 36-72 hours, although some patients lose more blood for longer. It is very unusual to require a blood transfusion after laser surgery.    It is useful to drink as  more fluid than normal in the first week after the operation because this helps the urine clear of any blood more quickly. Sometimes, fluid is flushed through the catheter to clear the urine of blood.    You will be able to eat and drink on the same day as the operation when you feel able to.    The catheter is generally removed after surgery, the date of removal varies and is dependent on several different factors. At first, it may be painful to pass your urine and it may come more frequently or urgently than normal. Any initial discomfort and frequency of urination usually improves steadily within a few days. Some of your symptoms, especially frequency, urgency and getting up at night to pass urine, may not improve for several months because these are often due to bladder over activity (which takes time to resolve after prostate surgery) rather than prostate blockage. Since a large portion of prostate tissue is removing with the laser technique, there may be some temporary loss of urinary control until your pelvic floor muscles strengthen and recover. Pelvic floor exercises before and after surgery help to decrease the chance of any temporary loss of urinary control (incontinence).    It is not unusual for your urine to turn bloody again and blood may be visible in the urine even up to 6 weeks after surgery but this is not usually a problem. Some patients, particularly those with small prostate glands, are unable to pass urine at all after the operation due to temporary swelling of the prostate area.  If this should happen, we would pass a catheter again to allow the swelling to resolve and the bladder to regain its function. Usually, patients who require re-catheterization go home with a catheter in place and then return within a week for a second catheter removal, which is successful in almost all cases.    If you travel from out of town or for medical reasons are admitted to the hospital after surgery, the  expected hospital stay is 1 night. It is safe for almost all patients to be discharged the afternoon after surgery.    Are there any side-effects?  Most procedures have a potential for side-effects. You should be reassured that, although all these complications are well-recognized, the majority of patients do not suffer any problems after a urological procedure.    Please use the check boxes to tick off individual items when you are happy that they have been discussed to your satisfaction:    Common (greater than 1 in 10)  Temporary mild burning, bleeding and frequency of urination after the procedure  No semen is produced during an orgasm in approximately 75% If the prostate is fully enucleated  Treatment may not relieve all the urinary symptoms  Infection of the bladder, testes or kidney requiring antibiotics  Loss of urinary control (incontinence) which usually resolves within 6 weeks (10%); this can usually be improved with pelvic floor exercises  Permanent Incontinence that is bothersome (1%)  Initial failure to pass urine after surgery requiring a new catheter for less than 1 week (10%)    Occasional (between 1 in 10 and 1 in 50)  Weakened erections or impotence (less than 2%)  Injury to the urethra causing delayed scar formation (stricture) in 1%  Finding unsuspected cancer in the removed tissue which may need further treatment (8%)    Rare (less than 1 in 50)  Retained tissue fragments floating in the bladder which may require a second telescopic procedure for their removal  Very rarely, perforation of the bladder requiring a temporary urinary catheter or open surgical repair  Persistent loss of urinary control which may require a further operation (0.5%)  Possible need to repeat treatment later due to re-obstruction (less than 2%)  May need self-catheterization to empty bladder fully If bladder weak (1%)  Bleeding requiring return to theatre and/or blood transfusion (less than 1%)    Hospital-acquired  infection (overall risk for [employer])  Colonization with MRSA (0.02%, 1 in 5,000)  Clostridium difficile bowel infection (0.04%; 1 in 2,500)  MRSA bloodstream infection (0.01%; 1 in 10,000)    (These rates may be greater in high-risk patients e.g. with long- term drainage tubes, after removal of the bladder for cancer, after previous infections, after prolonged hospitalization or after multiple admissions)    What should I expect when I get home?  When you leave hospital, you will be given a after visit summary of your admission. This holds important information about your inpatient stay and your operation. If, in the first few weeks after your discharge, you need to call your PCP for any reason or to attend another hospital, please take this summary with you to allow the doctors to see details of your treatment. This is particularly important if you need to consult another doctor within a few days of your discharge.    Most patients feel pretty much back to normal within a week. Apart from some burning on urination you should not be in any pain.  You may notice that you pass very small flecks of tissue in the urine at times within the first month as the prostate area heals. This does not usually interfere with the urinary stream or cause discomfort. It is normal to pass some blood in the urine (usually intermittently) for up to 6 weeks after surgery.    What else should I look out for?  If you experience increasing frequency, burning or difficulty on passing urine or worrying bleeding, contact your urologist.    In the event of severe bleeding, passage of clots or sudden difficulty in passing urine, you should contact your urologist immediately since it may be necessary for you to be re-admitted to hospital.    Are there any other important points?  Removal of your prostate should not adversely affect your sex life provided you are getting normal erections before the surgery. Sexual activity can be resumed as soon as  you are comfortable, usually after 3-4 weeks.    It is often helpful to continue with pelvic floor exercises as soon as possible after the operation since this can improve your control when you get home. The symptoms of an overactive bladder may take 3 months to resolve whereas the flow is improved immediately.    If you need any specific information on these exercises, please contact the long staff or the Specialist Nurses. The symptoms of an overactive bladder may take 3 months to resolve whereas the flow is improved immediately.    The results of any tissue removed will be available after 14 - 21 days and will be reviewed at your post-operative visit, typically 2-3 weeks after surgery.    Around 3 months after surgery you will be reviewed in the outpatient clinic and several tests repeated (including a flow rate, bladder scan & symptom score) to help assess the effects of the surgery. Please come to your clinic appointment prepared to pass urine for a flow test.    Most patients require a recovery period of 1-2 weeks at home before they feel ready for work. You should avoid any heavy lifting or physical straining during this time. You should not drive until you feel fully recovered; 1 week is the minimum period that most patients require before resuming driving, but you may drive sooner if you feel recovered.    Driving after surgery  It is your responsibility to ensure that you are fit to drive following your surgery. You do not normally need to notify the DMV unless you have a medical condition that will last for longer than 3 months after your surgery and may affect your ability to drive. You should, however, check with your insurance company before returning to driving. Your doctors will be happy to provide you with advice on request.    Who can I contact for more help or information?  Urology Clinic: [phone]    What should I do with this form?  Thank you for taking the trouble to read this information  sheet. Please retain a copy for your own records, please do so below.    If you would like a copy of this form to be filed in your hospital records for future reference, please let your Urologist or Specialist Nurse know.  If you do, however, decide to proceed with the scheduled procedure, you will be asked to sign a separate consent form which will be filed in your hospital notes and you will, in addition, be provided with a copy of the form if you wish.

## 2023-01-26 NOTE — PROGRESS NOTES
[place] - Urology   Clinic Note    SUBJECTIVE:     No chief complaint on file.      Referral from: Self, Hesham.    History of Present Illness:  Gunner Farr is a 68 y.o. male who presents to clinic for BPH.    [facility].  The patient desires either Rezum or HoLEP which are not offered [facility].  He reports very bothersome urgency frequency and nocturia.  Does have a history of prostatitis and has been treated with antibiotics.  Additionally reports he had elevated PSA and had an MRI negative biopsy.  He did not bring these records with him and they were not faxed over.  Has such significantly bothered symptoms that he desires intervention.  He is interested in an intervention that could be ejaculation sparing.  He does report good sexual function with erections and ejaculations in his interested in preserving this.    Patient endorses no additional complaints at this time.    Past Medical History:   Diagnosis Date    GERD (gastroesophageal reflux disease)     Hypertension     Prostate disease        Past Surgical History:   Procedure Laterality Date    APPENDECTOMY         Family History   Problem Relation Age of Onset    Lung disease Mother     Prostatitis Father        Social History     Tobacco Use    Smoking status: Never    Smokeless tobacco: Never   Substance Use Topics    Alcohol use: Yes     Comment: occassional    Drug use: No       Current Outpatient Medications on File Prior to Visit   Medication Sig Dispense Refill    atenolol (TENORMIN) 50 MG tablet Take 50 mg by mouth once daily.      diazepam (VALIUM) 5 MG tablet Take 1 tablet (5 mg total) by mouth every 8 (eight) hours. 15 tablet 0    fluticasone (FLONASE) 50 mcg/actuation nasal spray 1 spray by Each Nare route 2 (two) times daily as needed for Rhinitis. 15 g 0    ibuprofen (ADVIL,MOTRIN) 600 MG tablet Take 1 tablet (600 mg total) by mouth every 6 (six) hours as needed for Pain. 20 tablet 0  "   loratadine (CLARITIN) 10 mg tablet Take 1 tablet (10 mg total) by mouth once daily. 30 tablet 2    meloxicam (MOBIC) 7.5 MG tablet Take 1 tablet (7.5 mg total) by mouth once daily. (Patient not taking: Reported on 2/4/2020) 7 tablet 0    methocarbamol (ROBAXIN) 750 MG Tab Take 500 mg by mouth 4 (four) times daily.      morphine (MSIR) 15 MG tablet Take 1 tablet (15 mg total) by mouth every 12 (twelve) hours as needed for Pain (break through pain). (Patient not taking: Reported on 2/4/2020) 6 tablet 0    omeprazole (PRILOSEC) 20 MG capsule Take 1 capsule (20 mg total) by mouth once daily. 30 capsule 0    tamsulosin (FLOMAX) 0.4 mg Cp24 Take 0.4 mg by mouth once daily.      terazosin (HYTRIN) 2 MG capsule Take 2 mg by mouth every evening.      traMADol (ULTRAM) 50 mg tablet Take 1 tablet (50 mg total) by mouth every 4 (four) hours as needed for Pain. (Patient not taking: Reported on 2/4/2020) 30 tablet 0     No current facility-administered medications on file prior to visit.       Review of patient's allergies indicates:   Allergen Reactions    Hydrocodone-acetaminophen Shortness Of Breath     Shortness of breath^  Shortness of breath^      Caffeine      Anxiety^difficulty breathing  Anxiety^difficulty breathing         Review of Systems:  A review of 10+ systems was conducted with pertinent positive and negative findings documented in HPI with all other systems reviewed and negative.    OBJECTIVE:     Estimated body mass index is 24.21 kg/m² as calculated from the following:    Height as of 7/2/20: 5' 6" (1.676 m).    Weight as of 7/2/20: 68 kg (150 lb).    Vital Signs (Most Recent)  There were no vitals filed for this visit.    Physical Exam:  GENERAL: patient sitting comfortably  HEENT: normocephalic  NECK: supple, no JVD  PULM: normal chest rise, no increased WOB  HEART: non-diaphoretic  ABDO: soft, nondistended, nontender  BACK: no CVA tenderness bilaterally  SKIN: warm, dry, well perfused  EXT: no bruising " or edema  NEURO: grossly normal with no focal deficits  PSYCH: appropriate mood and affect    Genitourinary Exam:  deferred    Lab Results   Component Value Date    BUN 13 12/25/2013    CREATININE 0.9 12/25/2013    WBC 9.59 12/25/2013    HGB 14.9 12/25/2013    HCT 42.2 12/25/2013     12/25/2013    AST 21 12/25/2013    ALT 19 12/25/2013    ALKPHOS 73 12/25/2013    ALBUMIN 3.9 12/25/2013    INR 1.0 12/25/2013        Imaging:  I have personally reviewed all relevant imaging studies.    No results found for this or any previous visit (from the past 2160 hour(s)).  No results found for this or any previous visit (from the past 2160 hour(s)).  X-Ray Hand 2 View Left  Narrative: EXAMINATION:  XR HAND 2 VIEW LEFT    CLINICAL HISTORY:  Fracture of unspecified phalanx of left ring finger, initial encounter for open fracture    TECHNIQUE:  XR HAND 2 VIEW LEFT    COMPARISON:  11/24/2019    FINDINGS:  Compared with the prior study there has been no evidence of interval healing the nondisplaced fracture of the distal tuft of the distal phalanx of the 4th digit.  The fracture margins are smoother and the overlying soft tissue injury has healed.  Impression: No evidence of healing of nondisplaced fracture of the distal tuft of the 4th phalanx    Electronically signed by: Jaron Holden  Date:    01/07/2020  Time:    11:43       PSA:  No results found for: PSA, PSADIAG, PSATOTAL, PSAFREE    Testosterone:  No results found for: TOTALTESTOST, TOTALTESTOST, TESTOSTERONE     ASSESSMENT     1. BPH with urinary obstruction        PLAN:     BPH with urinary obstruction     - The patient's will bring in a copy of his MRI and his records at his follow-up visit at which time we will perform cystoscopy    - The risks, benefits, and technical details of HoLEP were discussed in detail today. We also discussed alternative treatment modalities for BPH with urinary obstruction.     - After reviewing the relative merits of alternative  treatment modalities we specifically reviewed potential complications related HoLEP itself. These include bleeding and infection, ureteral or bladder injury, urethral stricture, bladder neck contracture, failure to improve voiding dysfunction, persistent urinary retention, stress urinary incontinence, erectile dysfunction, retrograde ejaculation, and need for any further intervention or management.    - we extensively discussed HoLEP and Rezum in particular as well as all other treatment options.  The patient is interested in a therapy that could preserve his sexual function however he is hesitant to proceed with presumed due to the irritative side effects in the lack of immediate resolution of his urinary complaints.  He will think about things and we will perform cystoscopy and he will [facility] which should include an MRI of the prostate for size.    Horace Winchester MD  Urology  [facility]  [facility]    Disclaimer: This note has been generated using voice-recognition software. There may be typographical errors that have been missed during proof-reading.

## 2023-02-09 ENCOUNTER — PROCEDURE VISIT (OUTPATIENT)
Dept: UROLOGY | Facility: CLINIC | Age: 68
End: 2023-02-09
Payer: OTHER GOVERNMENT

## 2023-02-09 VITALS
HEART RATE: 76 BPM | DIASTOLIC BLOOD PRESSURE: 81 MMHG | WEIGHT: 151.25 LBS | HEIGHT: 66 IN | SYSTOLIC BLOOD PRESSURE: 143 MMHG | BODY MASS INDEX: 24.31 KG/M2

## 2023-02-09 DIAGNOSIS — N13.8 BPH WITH URINARY OBSTRUCTION: ICD-10-CM

## 2023-02-09 DIAGNOSIS — N40.1 BPH WITH URINARY OBSTRUCTION: ICD-10-CM

## 2023-02-09 PROCEDURE — 52000 PR CYSTOURETHROSCOPY: ICD-10-PCS | Mod: S$PBB,,, | Performed by: UROLOGY

## 2023-02-09 PROCEDURE — 52000 CYSTOURETHROSCOPY: CPT | Mod: PBBFAC,PO | Performed by: UROLOGY

## 2023-02-09 PROCEDURE — 87086 URINE CULTURE/COLONY COUNT: CPT | Performed by: UROLOGY

## 2023-02-09 PROCEDURE — 52000 CYSTOURETHROSCOPY: CPT | Mod: S$PBB,,, | Performed by: UROLOGY

## 2023-02-09 PROCEDURE — 99214 PR OFFICE/OUTPT VISIT, EST, LEVL IV, 30-39 MIN: ICD-10-PCS | Mod: 25,S$PBB,, | Performed by: UROLOGY

## 2023-02-09 PROCEDURE — 99214 OFFICE O/P EST MOD 30 MIN: CPT | Mod: 25,S$PBB,, | Performed by: UROLOGY

## 2023-02-09 RX ORDER — CEFAZOLIN SODIUM 2 G/50ML
2 SOLUTION INTRAVENOUS
Status: CANCELLED | OUTPATIENT
Start: 2023-02-09

## 2023-02-09 NOTE — H&P (VIEW-ONLY)
[place] - Urology   Clinic Note    SUBJECTIVE:     Chief Complaint   Patient presents with    Other     cysto       Referral from: Horace Winchester MD.    History of Present Illness:  Gunner Farr is a 68 y.o. male who presents to clinic for BPH.    [facility]  The patient desires either Rezum or [facility] which [facility].  He reports very bothersome urgency frequency and nocturia.  Does have a history of prostatitis and has been treated with antibiotics.  Additionally reports he had elevated PSA and had an MRI negative biopsy.  He did not bring these records with him and they were not faxed over.  Has such significantly bothered symptoms that he desires intervention.  He is interested in an intervention that could be ejaculation sparing.  He does report good sexual function with erections and ejaculations in his interested in preserving this.    02/09/2023  Patient is here for follow-up.  He brings his records with him which reveals an MRI that was performed that showed that he had a 33 cc prostate.  He had a cystoscopy performed today which revealed bilateral lobar hypertrophy and no median lobe.  He does continue to have some dysuria.  Strongly desires intervention.    Patient endorses no additional complaints at this time.    Past Medical History:   Diagnosis Date    GERD (gastroesophageal reflux disease)     Hypertension     Prostate disease        Past Surgical History:   Procedure Laterality Date    APPENDECTOMY         Family History   Problem Relation Age of Onset    Lung disease Mother     Prostatitis Father        Social History     Tobacco Use    Smoking status: Never    Smokeless tobacco: Never   Substance Use Topics    Alcohol use: Yes     Comment: occassional    Drug use: No       Current Outpatient Medications on File Prior to Visit   Medication Sig Dispense Refill    terazosin (HYTRIN) 2 MG capsule Take 2 mg by mouth every evening.       atenolol (TENORMIN) 50 MG tablet Take 50 mg by mouth once daily.      diazepam (VALIUM) 5 MG tablet Take 1 tablet (5 mg total) by mouth every 8 (eight) hours. 15 tablet 0    fluticasone (FLONASE) 50 mcg/actuation nasal spray 1 spray by Each Nare route 2 (two) times daily as needed for Rhinitis. (Patient not taking: Reported on 2/9/2023) 15 g 0    ibuprofen (ADVIL,MOTRIN) 600 MG tablet Take 1 tablet (600 mg total) by mouth every 6 (six) hours as needed for Pain. (Patient not taking: Reported on 2/9/2023) 20 tablet 0    loratadine (CLARITIN) 10 mg tablet Take 1 tablet (10 mg total) by mouth once daily. 30 tablet 2    meloxicam (MOBIC) 7.5 MG tablet Take 1 tablet (7.5 mg total) by mouth once daily. (Patient not taking: Reported on 2/4/2020) 7 tablet 0    methocarbamol (ROBAXIN) 750 MG Tab Take 500 mg by mouth 4 (four) times daily.      morphine (MSIR) 15 MG tablet Take 1 tablet (15 mg total) by mouth every 12 (twelve) hours as needed for Pain (break through pain). (Patient not taking: Reported on 2/4/2020) 6 tablet 0    omeprazole (PRILOSEC) 20 MG capsule Take 1 capsule (20 mg total) by mouth once daily. 30 capsule 0    tamsulosin (FLOMAX) 0.4 mg Cap Take 0.4 mg by mouth once daily.      traMADol (ULTRAM) 50 mg tablet Take 1 tablet (50 mg total) by mouth every 4 (four) hours as needed for Pain. (Patient not taking: Reported on 2/4/2020) 30 tablet 0     No current facility-administered medications on file prior to visit.       Review of patient's allergies indicates:   Allergen Reactions    Hydrocodone-acetaminophen Shortness Of Breath     Shortness of breath^  Shortness of breath^      Caffeine      Anxiety^difficulty breathing  Anxiety^difficulty breathing         Review of Systems:  A review of 10+ systems was conducted with pertinent positive and negative findings documented in HPI with all other systems reviewed and negative.    OBJECTIVE:     Estimated body mass index is 24.41 kg/m² as calculated from the  "following:    Height as of this encounter: 5' 6" (1.676 m).    Weight as of this encounter: 68.6 kg (151 lb 3.8 oz).    Vital Signs (Most Recent)  Vitals:    02/09/23 1411   BP: (!) 143/81   Pulse: 76       Physical Exam:  GENERAL: patient sitting comfortably  HEENT: normocephalic  NECK: supple, no JVD  PULM: normal chest rise, no increased WOB  HEART: non-diaphoretic  ABDO: soft, nondistended, nontender  BACK: no CVA tenderness bilaterally  SKIN: warm, dry, well perfused  EXT: no bruising or edema  NEURO: grossly normal with no focal deficits  PSYCH: appropriate mood and affect    Genitourinary Exam:  deferred    Lab Results   Component Value Date    BUN 13 12/25/2013    CREATININE 0.9 12/25/2013    WBC 9.59 12/25/2013    HGB 14.9 12/25/2013    HCT 42.2 12/25/2013     12/25/2013    AST 21 12/25/2013    ALT 19 12/25/2013    ALKPHOS 73 12/25/2013    ALBUMIN 3.9 12/25/2013    INR 1.0 12/25/2013        Imaging:  I have personally reviewed all relevant imaging studies.    No results found for this or any previous visit (from the past 2160 hour(s)).  No results found for this or any previous visit (from the past 2160 hour(s)).  X-Ray Hand 2 View Left  Narrative: EXAMINATION:  XR HAND 2 VIEW LEFT    CLINICAL HISTORY:  Fracture of unspecified phalanx of left ring finger, initial encounter for open fracture    TECHNIQUE:  XR HAND 2 VIEW LEFT    COMPARISON:  11/24/2019    FINDINGS:  Compared with the prior study there has been no evidence of interval healing the nondisplaced fracture of the distal tuft of the distal phalanx of the 4th digit.  The fracture margins are smoother and the overlying soft tissue injury has healed.  Impression: No evidence of healing of nondisplaced fracture of the distal tuft of the 4th phalanx    Electronically signed by: Jaron Holden  Date:    01/07/2020  Time:    11:43       PSA:  No results found for: PSA, PSADIAG, PSATOTAL, PSAFREE    Testosterone:  No results found for: " TOTALTESTOST, TOTALTESTOST, TESTOSTERONE     ASSESSMENT     1. BPH with urinary obstruction        PLAN:     BPH with urinary obstruction     - The patient's prostate has been measured using MRI at an estimated volume of 33 cc. His cystoscopy demonstrates bilateral lobar prostatic hypertrophy with no median lobe.    - I have explained the risks/benefits of Rezum. Discussed bleeding, irritative voiding symptoms, frequency, failure amongst other risks. He will require a catheter for 3-7 days based on the number of treatments. Also discussed very small risk ED or ejaculatory dysfunction. He was given the chance to ask questions. Alternatives treatment options were discussed. All questions and concerns were addressed.    - After being advised of these risks and treatment options he has elected to undergo ReZum scheduled for February 28th. We will send a urine culture preoperatively and will treat appropriately based on the results. No guarantees as to the outcome of the treatment were made.    Horace Winchester MD  Urology  [facility]    Disclaimer: This note has been generated using voice-recognition software. There may be typographical errors that have been missed during proof-reading.

## 2023-02-09 NOTE — PROCEDURES
Procedures    Cystoscopy Procedure Note    Procedure:   Flexible cysto-uretheroscopy    Pre Procedure Diagnosis:  BPH and LUTS    Post Procedure Diagnosis:  BPH and LUTS    Surgeon: Horace Winchester MD     Anesthesia: 2% uro-jet lidocaine jelly for local analgesia    Procedure note:  A flexible cysto-urethroscopy was performed after consent was obtained.  The risks and benefits were explained. 2% lidocaine urojet was used for local analgesia. The genitalia was prepped and draped in the sterile fashion.     The flexible scope was advanced into the urethra and into the bladder. There was a meatal stenosis that was encountered during scope passage, but was passagble.     The bladder was completely surveyed in a systematic fashion. The bilateral ureteral orifices were identified in their normal orthotopic locations with efflux noted bilaterally. The remained of the bladder was evaluated. No bladder tumors or lesions suspicious for malignancy were seen.     Upon retroflexion there was no significant median lobe enlargement. As the flexible cystoscope was being withdrawn, the prostate was evaluated carefully. The lateral lobes of the prostate were moderately enlarged.  The prostatic fossa was short.    The patient tolerated the procedure well without complication.     Horace Winchester MD  Urology  [facility]

## 2023-02-09 NOTE — PROGRESS NOTES
[place] - Urology   Clinic Note    SUBJECTIVE:     Chief Complaint   Patient presents with    Other     cysto       Referral from: Horace Winchester MD.    History of Present Illness:  Gunner Farr is a 68 y.o. male who presents to clinic for BPH.    [facility].  The patient desires either Rezum or [facility]P which [facility].  He reports very bothersome urgency frequency and nocturia.  Does have a history of prostatitis and has been treated with antibiotics.  Additionally reports he had elevated PSA and had an MRI negative biopsy.  He did not bring these records with him and they were not faxed over.  Has such significantly bothered symptoms that he desires intervention.  He is interested in an intervention that could be ejaculation sparing.  He does report good sexual function with erections and ejaculations in his interested in preserving this.    02/09/2023  Patient is here for follow-up.  He brings his records with him which reveals an MRI that was performed that showed that he had a 33 cc prostate.  He had a cystoscopy performed today which revealed bilateral lobar hypertrophy and no median lobe.  He does continue to have some dysuria.  Strongly desires intervention.    Patient endorses no additional complaints at this time.    Past Medical History:   Diagnosis Date    GERD (gastroesophageal reflux disease)     Hypertension     Prostate disease        Past Surgical History:   Procedure Laterality Date    APPENDECTOMY         Family History   Problem Relation Age of Onset    Lung disease Mother     Prostatitis Father        Social History     Tobacco Use    Smoking status: Never    Smokeless tobacco: Never   Substance Use Topics    Alcohol use: Yes     Comment: occassional    Drug use: No       Current Outpatient Medications on File Prior to Visit   Medication Sig Dispense Refill    terazosin (HYTRIN) 2 MG capsule Take 2 mg by mouth every evening.       atenolol (TENORMIN) 50 MG tablet Take 50 mg by mouth once daily.      diazepam (VALIUM) 5 MG tablet Take 1 tablet (5 mg total) by mouth every 8 (eight) hours. 15 tablet 0    fluticasone (FLONASE) 50 mcg/actuation nasal spray 1 spray by Each Nare route 2 (two) times daily as needed for Rhinitis. (Patient not taking: Reported on 2/9/2023) 15 g 0    ibuprofen (ADVIL,MOTRIN) 600 MG tablet Take 1 tablet (600 mg total) by mouth every 6 (six) hours as needed for Pain. (Patient not taking: Reported on 2/9/2023) 20 tablet 0    loratadine (CLARITIN) 10 mg tablet Take 1 tablet (10 mg total) by mouth once daily. 30 tablet 2    meloxicam (MOBIC) 7.5 MG tablet Take 1 tablet (7.5 mg total) by mouth once daily. (Patient not taking: Reported on 2/4/2020) 7 tablet 0    methocarbamol (ROBAXIN) 750 MG Tab Take 500 mg by mouth 4 (four) times daily.      morphine (MSIR) 15 MG tablet Take 1 tablet (15 mg total) by mouth every 12 (twelve) hours as needed for Pain (break through pain). (Patient not taking: Reported on 2/4/2020) 6 tablet 0    omeprazole (PRILOSEC) 20 MG capsule Take 1 capsule (20 mg total) by mouth once daily. 30 capsule 0    tamsulosin (FLOMAX) 0.4 mg Cap Take 0.4 mg by mouth once daily.      traMADol (ULTRAM) 50 mg tablet Take 1 tablet (50 mg total) by mouth every 4 (four) hours as needed for Pain. (Patient not taking: Reported on 2/4/2020) 30 tablet 0     No current facility-administered medications on file prior to visit.       Review of patient's allergies indicates:   Allergen Reactions    Hydrocodone-acetaminophen Shortness Of Breath     Shortness of breath^  Shortness of breath^      Caffeine      Anxiety^difficulty breathing  Anxiety^difficulty breathing         Review of Systems:  A review of 10+ systems was conducted with pertinent positive and negative findings documented in HPI with all other systems reviewed and negative.    OBJECTIVE:     Estimated body mass index is 24.41 kg/m² as calculated from the  "following:    Height as of this encounter: 5' 6" (1.676 m).    Weight as of this encounter: 68.6 kg (151 lb 3.8 oz).    Vital Signs (Most Recent)  Vitals:    02/09/23 1411   BP: (!) 143/81   Pulse: 76       Physical Exam:  GENERAL: patient sitting comfortably  HEENT: normocephalic  NECK: supple, no JVD  PULM: normal chest rise, no increased WOB  HEART: non-diaphoretic  ABDO: soft, nondistended, nontender  BACK: no CVA tenderness bilaterally  SKIN: warm, dry, well perfused  EXT: no bruising or edema  NEURO: grossly normal with no focal deficits  PSYCH: appropriate mood and affect    Genitourinary Exam:  deferred    Lab Results   Component Value Date    BUN 13 12/25/2013    CREATININE 0.9 12/25/2013    WBC 9.59 12/25/2013    HGB 14.9 12/25/2013    HCT 42.2 12/25/2013     12/25/2013    AST 21 12/25/2013    ALT 19 12/25/2013    ALKPHOS 73 12/25/2013    ALBUMIN 3.9 12/25/2013    INR 1.0 12/25/2013        Imaging:  I have personally reviewed all relevant imaging studies.    No results found for this or any previous visit (from the past 2160 hour(s)).  No results found for this or any previous visit (from the past 2160 hour(s)).  X-Ray Hand 2 View Left  Narrative: EXAMINATION:  XR HAND 2 VIEW LEFT    CLINICAL HISTORY:  Fracture of unspecified phalanx of left ring finger, initial encounter for open fracture    TECHNIQUE:  XR HAND 2 VIEW LEFT    COMPARISON:  11/24/2019    FINDINGS:  Compared with the prior study there has been no evidence of interval healing the nondisplaced fracture of the distal tuft of the distal phalanx of the 4th digit.  The fracture margins are smoother and the overlying soft tissue injury has healed.  Impression: No evidence of healing of nondisplaced fracture of the distal tuft of the 4th phalanx    Electronically signed by: Jaron Holden  Date:    01/07/2020  Time:    11:43       PSA:  No results found for: PSA, PSADIAG, PSATOTAL, PSAFREE    Testosterone:  No results found for: " TOTALTESTOST, TOTALTESTOST, TESTOSTERONE     ASSESSMENT     1. BPH with urinary obstruction        PLAN:     BPH with urinary obstruction     - The patient's prostate has been measured using MRI at an estimated volume of 33 cc. His cystoscopy demonstrates bilateral lobar prostatic hypertrophy with no median lobe.    - I have explained the risks/benefits of Rezum. Discussed bleeding, irritative voiding symptoms, frequency, failure amongst other risks. He will require a catheter for 3-7 days based on the number of treatments. Also discussed very small risk ED or ejaculatory dysfunction. He was given the chance to ask questions. Alternatives treatment options were discussed. All questions and concerns were addressed.    - After being advised of these risks and treatment options he has elected to undergo ReZum scheduled for February 28th. We will send a urine culture preoperatively and will treat appropriately based on the results. No guarantees as to the outcome of the treatment were made.    Horace Winchester MD  Urology  [facility]    Disclaimer: This note has been generated using voice-recognition software. There may be typographical errors that have been missed during proof-reading.

## 2023-02-11 LAB — BACTERIA UR CULT: NO GROWTH

## 2023-02-27 ENCOUNTER — TELEPHONE (OUTPATIENT)
Dept: UROLOGY | Facility: CLINIC | Age: 68
End: 2023-02-27
Payer: OTHER GOVERNMENT

## 2023-02-27 NOTE — TELEPHONE ENCOUNTER
----- Message from Hair Ruiz sent at 2/27/2023  9:33 AM CST -----  .Type: Procedure    Who Called: Pt  Would the patient rather a call back? Call  Best Call Back Number:[phone]  Additional Information:   Pt is requesting a time for procedure and location.

## 2023-02-27 NOTE — TELEPHONE ENCOUNTER
I spoke with pt, notified him that he could expect a phone call the day before surgery between 2:00 and 3:30 with arrival time and location. He also had numerous questions about the surgery which I clarified with Dr. Winchester. All questions were answered to pt's satisfaction at this time.

## 2023-02-28 ENCOUNTER — HOSPITAL ENCOUNTER (OUTPATIENT)
Facility: HOSPITAL | Age: 68
Discharge: HOME | End: 2023-02-28
Attending: UROLOGY | Admitting: UROLOGY
Payer: OTHER GOVERNMENT

## 2023-02-28 ENCOUNTER — ANESTHESIA EVENT (OUTPATIENT)
Dept: SURGERY | Facility: HOSPITAL | Age: 68
End: 2023-02-28
Payer: OTHER GOVERNMENT

## 2023-02-28 ENCOUNTER — ANESTHESIA (OUTPATIENT)
Dept: SURGERY | Facility: HOSPITAL | Age: 68
End: 2023-02-28
Payer: OTHER GOVERNMENT

## 2023-02-28 VITALS
OXYGEN SATURATION: 100 % | HEIGHT: 66 IN | DIASTOLIC BLOOD PRESSURE: 69 MMHG | WEIGHT: 150 LBS | BODY MASS INDEX: 24.11 KG/M2 | RESPIRATION RATE: 20 BRPM | SYSTOLIC BLOOD PRESSURE: 145 MMHG | HEART RATE: 72 BPM | TEMPERATURE: 98 F

## 2023-02-28 DIAGNOSIS — Z01.818 PRE-OP EVALUATION: ICD-10-CM

## 2023-02-28 DIAGNOSIS — N13.8 BPH WITH URINARY OBSTRUCTION: ICD-10-CM

## 2023-02-28 DIAGNOSIS — N40.1 BPH WITH URINARY OBSTRUCTION: ICD-10-CM

## 2023-02-28 LAB
ANION GAP SERPL CALC-SCNC: 5 MMOL/L (ref 8–16)
BUN SERPL-MCNC: 17 MG/DL (ref 8–23)
CALCIUM SERPL-MCNC: 9.2 MG/DL (ref 8.7–10.5)
CHLORIDE SERPL-SCNC: 104 MMOL/L (ref 95–110)
CO2 SERPL-SCNC: 27 MMOL/L (ref 23–29)
CREAT SERPL-MCNC: 1.1 MG/DL (ref 0.5–1.4)
EST. GFR  (NO RACE VARIABLE): >60 ML/MIN/1.73 M^2
GLUCOSE SERPL-MCNC: 104 MG/DL (ref 70–110)
POTASSIUM SERPL-SCNC: 3.8 MMOL/L (ref 3.5–5.1)
SODIUM SERPL-SCNC: 136 MMOL/L (ref 136–145)

## 2023-02-28 PROCEDURE — 37000008 HC ANESTHESIA 1ST 15 MINUTES: Performed by: UROLOGY

## 2023-02-28 PROCEDURE — 00914 ANES TRURL PX RESCJ PRST8: CPT | Performed by: UROLOGY

## 2023-02-28 PROCEDURE — 93010 EKG 12-LEAD: ICD-10-PCS | Mod: ,,, | Performed by: INTERNAL MEDICINE

## 2023-02-28 PROCEDURE — 71000016 HC POSTOP RECOV ADDL HR: Performed by: UROLOGY

## 2023-02-28 PROCEDURE — D9220A PRA ANESTHESIA: Mod: ANES,,, | Performed by: ANESTHESIOLOGY

## 2023-02-28 PROCEDURE — 53854 PR TRANSURETH DESTRUCTION, PROSTATE TISSUE, RF WV THERMOTHERAPY: ICD-10-PCS | Mod: ,,, | Performed by: UROLOGY

## 2023-02-28 PROCEDURE — 36000706: Performed by: UROLOGY

## 2023-02-28 PROCEDURE — D9220A PRA ANESTHESIA: Mod: CRNA,,, | Performed by: NURSE ANESTHETIST, CERTIFIED REGISTERED

## 2023-02-28 PROCEDURE — D9220A PRA ANESTHESIA: ICD-10-PCS | Mod: ANES,,, | Performed by: ANESTHESIOLOGY

## 2023-02-28 PROCEDURE — 80048 BASIC METABOLIC PNL TOTAL CA: CPT | Performed by: UROLOGY

## 2023-02-28 PROCEDURE — 25000003 PHARM REV CODE 250: Performed by: UROLOGY

## 2023-02-28 PROCEDURE — 63600175 PHARM REV CODE 636 W HCPCS: Performed by: NURSE ANESTHETIST, CERTIFIED REGISTERED

## 2023-02-28 PROCEDURE — D9220A PRA ANESTHESIA: ICD-10-PCS | Mod: CRNA,,, | Performed by: NURSE ANESTHETIST, CERTIFIED REGISTERED

## 2023-02-28 PROCEDURE — 71000015 HC POSTOP RECOV 1ST HR: Performed by: UROLOGY

## 2023-02-28 PROCEDURE — 25000003 PHARM REV CODE 250: Performed by: NURSE ANESTHETIST, CERTIFIED REGISTERED

## 2023-02-28 PROCEDURE — 36000707: Performed by: UROLOGY

## 2023-02-28 PROCEDURE — 71000039 HC RECOVERY, EACH ADD'L HOUR: Performed by: UROLOGY

## 2023-02-28 PROCEDURE — 93005 ELECTROCARDIOGRAM TRACING: CPT | Mod: 59

## 2023-02-28 PROCEDURE — 36415 COLL VENOUS BLD VENIPUNCTURE: CPT | Performed by: UROLOGY

## 2023-02-28 PROCEDURE — 53854 TRURL DSTRJ PRST8 TISS RF WV: CPT | Mod: ,,, | Performed by: UROLOGY

## 2023-02-28 PROCEDURE — 71000033 HC RECOVERY, INTIAL HOUR: Performed by: UROLOGY

## 2023-02-28 PROCEDURE — 63600175 PHARM REV CODE 636 W HCPCS: Performed by: ANESTHESIOLOGY

## 2023-02-28 PROCEDURE — 93010 ELECTROCARDIOGRAM REPORT: CPT | Mod: ,,, | Performed by: INTERNAL MEDICINE

## 2023-02-28 PROCEDURE — 37000009 HC ANESTHESIA EA ADD 15 MINS: Performed by: UROLOGY

## 2023-02-28 PROCEDURE — 63600175 PHARM REV CODE 636 W HCPCS: Performed by: UROLOGY

## 2023-02-28 PROCEDURE — 27201423 OPTIME MED/SURG SUP & DEVICES STERILE SUPPLY: Performed by: UROLOGY

## 2023-02-28 RX ORDER — ONDANSETRON 2 MG/ML
4 INJECTION INTRAMUSCULAR; INTRAVENOUS ONCE AS NEEDED
Status: DISCONTINUED | OUTPATIENT
Start: 2023-02-28 | End: 2023-02-28 | Stop reason: HOSPADM

## 2023-02-28 RX ORDER — DEXAMETHASONE SODIUM PHOSPHATE 4 MG/ML
INJECTION, SOLUTION INTRA-ARTICULAR; INTRALESIONAL; INTRAMUSCULAR; INTRAVENOUS; SOFT TISSUE
Status: DISCONTINUED | OUTPATIENT
Start: 2023-02-28 | End: 2023-02-28

## 2023-02-28 RX ORDER — SODIUM CHLORIDE 0.9 % (FLUSH) 0.9 %
3 SYRINGE (ML) INJECTION
Status: DISCONTINUED | OUTPATIENT
Start: 2023-02-28 | End: 2023-02-28 | Stop reason: HOSPADM

## 2023-02-28 RX ORDER — OXYBUTYNIN CHLORIDE 5 MG/1
5 TABLET ORAL 3 TIMES DAILY PRN
Qty: 90 TABLET | Refills: 0 | Status: SHIPPED | OUTPATIENT
Start: 2023-02-28 | End: 2023-06-26

## 2023-02-28 RX ORDER — MEPERIDINE HYDROCHLORIDE 50 MG/ML
12.5 INJECTION INTRAMUSCULAR; INTRAVENOUS; SUBCUTANEOUS ONCE AS NEEDED
Status: DISCONTINUED | OUTPATIENT
Start: 2023-02-28 | End: 2023-02-28 | Stop reason: HOSPADM

## 2023-02-28 RX ORDER — ROCURONIUM BROMIDE 10 MG/ML
INJECTION, SOLUTION INTRAVENOUS
Status: DISCONTINUED | OUTPATIENT
Start: 2023-02-28 | End: 2023-02-28

## 2023-02-28 RX ORDER — LIDOCAINE HYDROCHLORIDE 20 MG/ML
INJECTION INTRAVENOUS
Status: DISCONTINUED | OUTPATIENT
Start: 2023-02-28 | End: 2023-02-28

## 2023-02-28 RX ORDER — PHENAZOPYRIDINE HYDROCHLORIDE 200 MG/1
200 TABLET, FILM COATED ORAL 3 TIMES DAILY PRN
Qty: 21 TABLET | Refills: 0 | Status: SHIPPED | OUTPATIENT
Start: 2023-02-28 | End: 2023-03-07

## 2023-02-28 RX ORDER — OXYBUTYNIN CHLORIDE 5 MG/1
5 TABLET ORAL ONCE
Status: COMPLETED | OUTPATIENT
Start: 2023-02-28 | End: 2023-02-28

## 2023-02-28 RX ORDER — SUCCINYLCHOLINE CHLORIDE 20 MG/ML
INJECTION INTRAMUSCULAR; INTRAVENOUS
Status: DISCONTINUED | OUTPATIENT
Start: 2023-02-28 | End: 2023-02-28

## 2023-02-28 RX ORDER — FENTANYL CITRATE 50 UG/ML
25 INJECTION, SOLUTION INTRAMUSCULAR; INTRAVENOUS EVERY 5 MIN PRN
Status: COMPLETED | OUTPATIENT
Start: 2023-02-28 | End: 2023-02-28

## 2023-02-28 RX ORDER — OXYCODONE AND ACETAMINOPHEN 5; 325 MG/1; MG/1
1 TABLET ORAL EVERY 4 HOURS PRN
Qty: 12 TABLET | Refills: 0 | Status: SHIPPED | OUTPATIENT
Start: 2023-02-28

## 2023-02-28 RX ORDER — MIDAZOLAM HYDROCHLORIDE 1 MG/ML
INJECTION, SOLUTION INTRAMUSCULAR; INTRAVENOUS
Status: DISCONTINUED | OUTPATIENT
Start: 2023-02-28 | End: 2023-02-28

## 2023-02-28 RX ORDER — IBUPROFEN 800 MG/1
800 TABLET ORAL 3 TIMES DAILY PRN
Qty: 90 TABLET | Refills: 0 | Status: SHIPPED | OUTPATIENT
Start: 2023-02-28 | End: 2023-03-30

## 2023-02-28 RX ORDER — PHENAZOPYRIDINE HYDROCHLORIDE 100 MG/1
100 TABLET, FILM COATED ORAL ONCE
Status: COMPLETED | OUTPATIENT
Start: 2023-02-28 | End: 2023-02-28

## 2023-02-28 RX ORDER — CEFAZOLIN SODIUM 2 G/50ML
2 SOLUTION INTRAVENOUS
Status: COMPLETED | OUTPATIENT
Start: 2023-02-28 | End: 2023-02-28

## 2023-02-28 RX ORDER — PHENYLEPHRINE HYDROCHLORIDE 10 MG/ML
INJECTION INTRAVENOUS
Status: DISCONTINUED | OUTPATIENT
Start: 2023-02-28 | End: 2023-02-28

## 2023-02-28 RX ORDER — OXYCODONE AND ACETAMINOPHEN 5; 325 MG/1; MG/1
1 TABLET ORAL ONCE
Status: COMPLETED | OUTPATIENT
Start: 2023-02-28 | End: 2023-02-28

## 2023-02-28 RX ORDER — PROPOFOL 10 MG/ML
VIAL (ML) INTRAVENOUS
Status: DISCONTINUED | OUTPATIENT
Start: 2023-02-28 | End: 2023-02-28

## 2023-02-28 RX ADMIN — OXYCODONE HYDROCHLORIDE AND ACETAMINOPHEN 1 TABLET: 5; 325 TABLET ORAL at 02:02

## 2023-02-28 RX ADMIN — FENTANYL CITRATE 25 MCG: 50 INJECTION INTRAMUSCULAR; INTRAVENOUS at 12:02

## 2023-02-28 RX ADMIN — SODIUM CHLORIDE, SODIUM LACTATE, POTASSIUM CHLORIDE, AND CALCIUM CHLORIDE: .6; .31; .03; .02 INJECTION, SOLUTION INTRAVENOUS at 11:02

## 2023-02-28 RX ADMIN — ROCURONIUM BROMIDE 10 MG: 10 INJECTION, SOLUTION INTRAVENOUS at 11:02

## 2023-02-28 RX ADMIN — DEXAMETHASONE SODIUM PHOSPHATE 8 MG: 4 INJECTION, SOLUTION INTRA-ARTICULAR; INTRALESIONAL; INTRAMUSCULAR; INTRAVENOUS; SOFT TISSUE at 11:02

## 2023-02-28 RX ADMIN — OXYBUTYNIN CHLORIDE 5 MG: 5 TABLET ORAL at 02:02

## 2023-02-28 RX ADMIN — PROPOFOL 50 MG: 10 INJECTION, EMULSION INTRAVENOUS at 11:02

## 2023-02-28 RX ADMIN — PROPOFOL 150 MG: 10 INJECTION, EMULSION INTRAVENOUS at 11:02

## 2023-02-28 RX ADMIN — FENTANYL CITRATE 25 MCG: 50 INJECTION INTRAMUSCULAR; INTRAVENOUS at 01:02

## 2023-02-28 RX ADMIN — MIDAZOLAM 2 MG: 1 INJECTION INTRAMUSCULAR; INTRAVENOUS at 11:02

## 2023-02-28 RX ADMIN — LIDOCAINE HYDROCHLORIDE 100 MG: 20 INJECTION, SOLUTION INTRAVENOUS at 11:02

## 2023-02-28 RX ADMIN — SUCCINYLCHOLINE CHLORIDE 140 MG: 20 INJECTION, SOLUTION INTRAMUSCULAR; INTRAVENOUS at 11:02

## 2023-02-28 RX ADMIN — CEFAZOLIN SODIUM 2 G: 2 SOLUTION INTRAVENOUS at 11:02

## 2023-02-28 RX ADMIN — SUGAMMADEX 200 MG: 100 INJECTION, SOLUTION INTRAVENOUS at 12:02

## 2023-02-28 RX ADMIN — PHENYLEPHRINE HYDROCHLORIDE 100 MCG: 10 INJECTION INTRAVENOUS at 11:02

## 2023-02-28 RX ADMIN — PHENAZOPYRIDINE HYDROCHLORIDE 100 MG: 100 TABLET ORAL at 02:02

## 2023-02-28 NOTE — ANESTHESIA PREPROCEDURE EVALUATION
02/28/2023  Gunner Farr is a 68 y.o., male.      Pre-op Assessment    I have reviewed the Patient Summary Reports.     I have reviewed the Nursing Notes.    I have reviewed the Medications.     Review of Systems  Anesthesia Hx:  Denies Family Hx of Anesthesia complications.   Denies Personal Hx of Anesthesia complications.        Patient Active Problem List   Diagnosis    Open nondisplaced fracture of distal phalanx of left ring finger with routine healing       Past Medical History:   Diagnosis Date    GERD (gastroesophageal reflux disease)     Hypertension     Prostate disease        ECHO: No results found for this or any previous visit.      Body mass index is 24.21 kg/m².    Tobacco Use: Low Risk     Smoking Tobacco Use: Never    Smokeless Tobacco Use: Never    Passive Exposure: Not on file       Social History     Substance and Sexual Activity   Drug Use No        Alcohol Use: Not on file       Review of patient's allergies indicates:   Allergen Reactions    Hydrocodone-acetaminophen Shortness Of Breath     Shortness of breath^  Shortness of breath^      Caffeine      Anxiety^difficulty breathing  Anxiety^difficulty breathing           Airway:  No value filed.     Physical Exam    Airway:  Mouth Opening: Normal  TM Distance: Normal          Anesthesia Plan  Type of Anesthesia, risks & benefits discussed:    Anesthesia Type: Gen Supraglottic Airway  Intra-op Monitoring Plan: Standard ASA Monitors  Post Op Pain Control Plan: multimodal analgesia  Induction:  IV  Informed Consent: Informed consent signed with the Patient and all parties understand the risks and agree with anesthesia plan.  All questions answered.   ASA Score: 2  Day of Surgery Review of History & Physical: H&P Update referred to the surgeon/provider.    Ready For Surgery From Anesthesia Perspective.     .

## 2023-02-28 NOTE — DISCHARGE SUMMARY
[facility]  Discharge Note  Short Stay    Admit Date: 2/28/2023    Discharge Date and Time: 02/28/2023 12:09 PM      Attending Physician: Horace Winchester MD     Discharge Provider: Horace Winchester MD    Diagnoses:  There are no hospital problems to display for this patient.      Discharged Condition: stable    Procedure(s) (LRB):  CYSTOSCOPY WITH TRANSURETHRAL DESTRUCTION OF PROSTATE,RFA (N/A)     Hospital Course: Patient was admitted for the above procedure and tolerated the procedure well with no complications. He was discharged home in good condition on the same day.       Final Diagnoses: Same as principal problem.    Disposition: Home or Self Care    Follow up/Patient Instructions:    Medications:  Reconciled Home Medications:   Current Discharge Medication List        START taking these medications    Details   oxybutynin (DITROPAN) 5 MG Tab Take 1 tablet (5 mg total) by mouth 3 (three) times daily as needed (Bladder spasm, stent pain).  Qty: 90 tablet, Refills: 0      oxyCODONE-acetaminophen (PERCOCET) 5-325 mg per tablet Take 1 tablet by mouth every 4 (four) hours as needed for Pain.  Qty: 12 tablet, Refills: 0      phenazopyridine (PYRIDIUM) 200 MG tablet Take 1 tablet (200 mg total) by mouth 3 (three) times daily as needed for Pain.  Qty: 21 tablet, Refills: 0           CONTINUE these medications which have CHANGED    Details   ibuprofen (ADVIL,MOTRIN) 800 MG tablet Take 1 tablet (800 mg total) by mouth 3 (three) times daily as needed for Pain.  Qty: 90 tablet, Refills: 0           CONTINUE these medications which have NOT CHANGED    Details   atenolol (TENORMIN) 50 MG tablet Take 50 mg by mouth once daily.      diazepam (VALIUM) 5 MG tablet Take 1 tablet (5 mg total) by mouth every 8 (eight) hours.  Qty: 15 tablet, Refills: 0      fluticasone (FLONASE) 50 mcg/actuation nasal spray 1 spray by Each Nare route 2 (two) times daily as needed for Rhinitis.  Qty: 15 g, Refills: 0      loratadine  (CLARITIN) 10 mg tablet Take 1 tablet (10 mg total) by mouth once daily.  Qty: 30 tablet, Refills: 2      meloxicam (MOBIC) 7.5 MG tablet Take 1 tablet (7.5 mg total) by mouth once daily.  Qty: 7 tablet, Refills: 0      methocarbamol (ROBAXIN) 750 MG Tab Take 500 mg by mouth 4 (four) times daily.      morphine (MSIR) 15 MG tablet Take 1 tablet (15 mg total) by mouth every 12 (twelve) hours as needed for Pain (break through pain).  Qty: 6 tablet, Refills: 0      omeprazole (PRILOSEC) 20 MG capsule Take 1 capsule (20 mg total) by mouth once daily.  Qty: 30 capsule, Refills: 0      tamsulosin (FLOMAX) 0.4 mg Cap Take 0.4 mg by mouth once daily.      terazosin (HYTRIN) 2 MG capsule Take 2 mg by mouth every evening.      traMADol (ULTRAM) 50 mg tablet Take 1 tablet (50 mg total) by mouth every 4 (four) hours as needed for Pain.  Qty: 30 tablet, Refills: 0           Discharge Procedure Orders   Diet Adult Regular     No driving until:   Order Comments: No longer taking narcotic pain medications     No dressing needed     Notify your health care provider if you experience any of the following:  temperature >100.4     Notify your health care provider if you experience any of the following:  persistent nausea and vomiting or diarrhea     Notify your health care provider if you experience any of the following:  severe uncontrolled pain     Activity as tolerated      Follow-up Information       Horace Winchester MD Follow up in 3 day(s).    Specialty: Urology  Contact information:  [address]  [address]  [place] [address] [phone]                             Time spent on Discharge: 15 minutes

## 2023-02-28 NOTE — TRANSFER OF CARE
"Anesthesia Transfer of Care Note    Patient: Gunner Farr    Procedure(s) Performed: Procedure(s) (LRB):  CYSTOSCOPY WITH TRANSURETHRAL DESTRUCTION OF PROSTATE,RFA (N/A)    Patient location: PACU    Transport from OR: Transported from OR on 6-10 L/min O2 by face mask with adequate spontaneous ventilation    Post pain: adequate analgesia    Post assessment: no apparent anesthetic complications and tolerated procedure well    Post vital signs: stable    Level of consciousness: awake and alert    Complications: none    Transfer of care protocol was followed      Last vitals:   Visit Vitals  BP (!) 163/84 (BP Location: Left arm, Patient Position: Sitting)   Pulse 87   Temp 37.2 °C (99 °F) (Temporal)   Resp 16   Ht 5' 6" (1.676 m)   Wt 68 kg (150 lb)   SpO2 98%   BMI 24.21 kg/m²     "

## 2023-02-28 NOTE — INTERVAL H&P NOTE
The patient has been examined and the H&P has been reviewed:    I concur with the findings and no changes have occurred since H&P was written.    Surgery risks, benefits and alternative options discussed and understood by patient/family.    Horace Winchester MD  Urology  [facility]      There are no hospital problems to display for this patient.

## 2023-02-28 NOTE — OP NOTE
[facility] Surgery ([facility])   Operative Note     Date: 02/28/2023     Pre-Op Diagnosis: BPH with obstructive urinary symptoms    Post-Op Diagnosis: BPH with obstructive urinary symptoms     Procedure(s) Performed:   Transurethral destruction of prostate; radiofrequency thermotherapy (ReZuM)  Cystourethroscopy  Physician placement of pelayo catheter      Specimen(s): none     Staff Surgeon: Horace Winchester MD     Assistant Surgeon: None     Anesthesia:  General endotracheal anesthesia     Findings:    1. Short prostate with bilateral hyperplasia     Estimated Blood Loss: Minimal     Drains:   1.  18 Fr pelayo catheter    Indications:   Gunner Farr is a 68 y.o. male with BPH presenting for surgical intervention with ReZum. Risks, benefits, and alternative treatment options were discussed. All questions were answered. No guarantees as to the outcome of the treatment were made. Consents were obtained.    Procedure in detail:   The patient was brought to the operating suite and placed in the lithotomy position. General anesthesia was administered. SCDs were applied and working prior to induction of anesthesia. That patient was then prepped and draped in the usual sterile fashion. Time out was performed and preoperative antibiotics were confirmed.       We began the case by inserting the ReZum scope into the bladder. No urethral strictures were seen and scope entered easily. Next, we examined the prostate and found a short prostate with bilateral hyperplasia with no median lobe. Radiofrequency was then used to create thermal energy to selectively ablate prostate tissue 1 cm from the bladder neck to the proximal end of the veru spaced 1 cm apart.      After this, 4 total treatments were given. Specifically 2 treatments were given in at the 3 and 9 o'clock positions bilaterally.      At the completion of the procedure the device was removed. There was a careful check that there were no clots in the bladder or injury to the  bladder, prostate or urethra. A 18 fr pelayo catheter was placed with 10 mL of sterile water in the balloon with return of clear yellow urine.     The patient tolerated the procedure well and was transferred to the PACU in stable condition.       Disposition:    The patient will be discharged home with 1 week of pyridium, 1 week of ditropan, and 2 weeks of NSAIDS. He will follow up with in 3 days to have his pelayo catheter removed.    Horace Winchester MD  Urology  [facility]

## 2023-02-28 NOTE — PATIENT INSTRUCTIONS
Rezum Prostate Water Vapor Therapy   Postoperative Instructions    Butt Catheter: The catheter typically stays in for 3 to 7 days depending on the size of your prostate gland. Once the catheter is removed after Rezum procedure, most men can void. There are a subset of men who may not be able to void if the bladder is not ready to go yet and may need the catheter replaced. When the catheter first comes out it is very common to feel like you have a urinary tract infection with burning with urination, increased urinary frequency and urinary urgency. These symptoms typically improve over a 2 to 3 week period of time. If these symptoms persist then you should come in for a urine check to make sure you do not have an infection.      Fluids: You should drink plenty of fluids during the day to promote increased production of urine.  Avoid bladder irritants including caffeine, alcohol, smoking, spicy foods, acidic foods, tomato-based products, citrus, artificial sweeteners, chocolate, coffee or tea.    Activity: You should avoid strenuous activity during the first two weeks, which includes heavy lifting, bike riding, treadmill activity, or riding a lawnmower. Avoid sexual activity for two weeks. You may resume your normal daily activities. You may resume driving a car.    Urinary symptoms:  During the first several months, the primary improvement to note is increased urinary flow. With the Rezum procedure, the procedure destroys the prostate tissue with steam and can take some time for the body to reabsorb this prostate tissue. The bladder should now be able to empty with better flow, which you will notice as a more powerful urinary stream. It is important to recognize that it may take a much longer timeframe for the frequency of urination, particularly frequency of urination at night, to reduce.    Symptoms will get worse before they get better. Generally, at two weeks symptoms begin to stabilize. At four weeks, patients  are back where they were before the procedure, and at six weeks patients start to get off medication.    The bladder has been working against obstruction for years. As the bladder has worked against obstruction, it has become thicker and less elastic so that its storage capacity is diminished. After the Rezum procedure, the bladder is no longer working as hard against obstruction. Some of the thickening of the bladder muscle should decreases and the bladder should regain its elasticity. When that occurs, there will be less urgency of urination, but it may take time to notice this improvement. It is important to recognize that it took a long time for the bladder to get to where we are now and so it may take time for the bladder to reverse those changes.    Blood in the urine: You may note some blood in the urine and semen after this procedure. This is normal, and may be intermittent and last for several weeks.  Drink plenty of water.     Medications: Take medications  as prescribed.      Follow up appointment:  The office will call to arrange your follow up for pelayo catheter removal.     Call the office ([phone]) or go to nearest Emergency Room if:   -Fever of 101 degrees F or above, chills.    -Inability to urinate.  -Severe Pain that is not controlled with medications.     Horace Winchester MD

## 2023-03-01 ENCOUNTER — NURSE TRIAGE (OUTPATIENT)
Dept: ADMINISTRATIVE | Facility: CLINIC | Age: 68
End: 2023-03-01
Payer: OTHER GOVERNMENT

## 2023-03-01 NOTE — ANESTHESIA POSTPROCEDURE EVALUATION
Anesthesia Post Evaluation    Patient: Gunner Farr    Procedure(s) Performed: Procedure(s) (LRB):  CYSTOSCOPY WITH TRANSURETHRAL DESTRUCTION OF PROSTATE,RFA (N/A)    Final Anesthesia Type: general      Patient location during evaluation: PACU  Patient participation: Yes- Able to Participate  Level of consciousness: awake and alert  Post-procedure vital signs: reviewed and stable  Pain management: adequate  Airway patency: patent    PONV status at discharge: No PONV  Anesthetic complications: no      Cardiovascular status: stable  Respiratory status: spontaneous ventilation  Hydration status: euvolemic  Follow-up not needed.          Vitals Value Taken Time   /69 02/28/23 1610   Temp 36.6 °C (97.9 °F) 02/28/23 1610   Pulse 72 02/28/23 1610   Resp 20 02/28/23 1610   SpO2 100 % 02/28/23 1610         Event Time   Out of Recovery 13:45:00         Pain/Richard Score: Pain Rating Prior to Med Admin: 7 (2/28/2023  2:18 PM)  Pain Rating Post Med Admin: 4 (2/28/2023  4:10 PM)  Richard Score: 10 (2/28/2023  4:10 PM)

## 2023-03-01 NOTE — TELEPHONE ENCOUNTER
Pt states he is urinating around his pelayo cath.  Pt denies any pain at this time.  Care advice states to call provider within 24 hours, pt verbally understands, all questions answered.  Advised to call back for any worsening symptoms.    Reason for Disposition   Leakage of urine around catheter    Additional Information   Negative: Shock suspected (e.g., cold/pale/clammy skin, too weak to stand, low BP, rapid pulse)   Negative: Sounds like a life-threatening emergency to the triager   Negative: [1] Catheter was accidentally pulled-out AND [2] bright red continuous bleeding   Negative: SEVERE abdominal pain   Negative: Fever > 100.4 F (38.0 C)   Negative: [1] Drinking very little AND [2] dehydration suspected (e.g., no urine > 12 hours, very dry mouth, very lightheaded)   Negative: Patient sounds very sick or weak to the triager   Negative: Catheter was accidentally pulled-out   Negative: [1] Catheter is broken AND [2] is not usable   Negative: Bleeding around catheter (e.g., from penis or female urethra)   Negative: Lower abdominal pain or distention   Negative: [1] No urine in bag > 4 hours AND [2] catheter is not kinked   Negative: [1] Tea-colored or slightly red urine lasts > 24 hours AND [2] not cleared by increasing fluid intake    AND [3] no recent prostate or bladder surgery   Negative: [1] Cloudy urine lasts > 24 hours AND [2] not cleared by increasing fluid intake   Negative: [1] Bloody or red-colored urine AND [2] no recent prostate or bladder surgery  (Exception: brief episode and urine now clear)   Negative: [1] Bloody or red-colored urine AND [2] prostate or bladder surgery > 3 days (72 hours) ago   Negative: Urine smells bad   Negative: [1] Catheter is broken or cracked AND [2] is still usable    Protocols used: Urinary Catheter (e.g., Pelayo) Symptoms and [id number]

## 2023-03-01 NOTE — TELEPHONE ENCOUNTER
I spoke with the patient and advised him that everything he is seeing is normal (per Dr. Winchester) and gaave him the advice from Dr. Winchester to help treat his bladder spasms and burning sensation. Also told him that the blood in his pelayo is normal as well and may persist for some weeks. We reviewed his upcoming post operative voiding trial on 3/3 and also his follow up appt in 3 weeks. Patient verbalizes understanding of everything discussed.

## 2023-03-03 ENCOUNTER — CLINICAL SUPPORT (OUTPATIENT)
Dept: UROLOGY | Facility: CLINIC | Age: 68
End: 2023-03-03
Payer: OTHER GOVERNMENT

## 2023-03-03 NOTE — PROGRESS NOTES
Instilled 150 cc sterile water into Pelayo, pt then reported the urge to urinate. I aspirated 10 cc clear liquid from the pelayo balloon and withdrew the catheter. Patient was given a urinal into which he voided  175 cc of bloody (fruit punch color) urine. I explained follow up instructions should he not be able to urinate later in the day. He and his partner verbalized understanding. He was discharged to home AAO x4 and calm as he arrived.

## 2023-03-17 ENCOUNTER — TELEPHONE (OUTPATIENT)
Dept: UROLOGY | Facility: CLINIC | Age: 68
End: 2023-03-17
Payer: OTHER GOVERNMENT

## 2023-03-17 NOTE — TELEPHONE ENCOUNTER
----- Message from Reji Zaidi sent at 3/17/2023 10:56 AM CDT -----  Type:  after procedure questions     Who Called:Pt   Does the patient know what this is regarding?:would like to know if it's normal because he's still passing blood after procedure   Would the patient rather a call back or a response via [facility]? Call   Best Call Back Number: [phone]  Additional Information:

## 2023-03-17 NOTE — TELEPHONE ENCOUNTER
Called pt and gave him instructions per Dr. Winchester that it is ok to drive and work now. Patient verbalized understanding.

## 2023-03-17 NOTE — TELEPHONE ENCOUNTER
I advised pt that intermittent bleeding is normal for up to 12 weeks after surgery. Patient s/p Rezum on 2/28/23. He asks when he can drive and return to work.

## 2023-03-22 ENCOUNTER — OFFICE VISIT (OUTPATIENT)
Dept: UROLOGY | Facility: CLINIC | Age: 68
End: 2023-03-22
Payer: OTHER GOVERNMENT

## 2023-03-22 VITALS
DIASTOLIC BLOOD PRESSURE: 79 MMHG | BODY MASS INDEX: 22.94 KG/M2 | WEIGHT: 142.75 LBS | HEIGHT: 66 IN | SYSTOLIC BLOOD PRESSURE: 129 MMHG | HEART RATE: 80 BPM

## 2023-03-22 DIAGNOSIS — N40.1 BPH WITH URINARY OBSTRUCTION: Primary | ICD-10-CM

## 2023-03-22 DIAGNOSIS — N13.8 BPH WITH URINARY OBSTRUCTION: Primary | ICD-10-CM

## 2023-03-22 PROCEDURE — 99024 POSTOP FOLLOW-UP VISIT: CPT | Mod: S$PBB,,, | Performed by: UROLOGY

## 2023-03-22 PROCEDURE — 99214 OFFICE O/P EST MOD 30 MIN: CPT | Mod: PBBFAC,PO | Performed by: UROLOGY

## 2023-03-22 PROCEDURE — 99999 PR PBB SHADOW E&M-EST. PATIENT-LVL IV: CPT | Mod: PBBFAC,,, | Performed by: UROLOGY

## 2023-03-22 PROCEDURE — 99024 PR POST-OP FOLLOW-UP VISIT: ICD-10-PCS | Mod: S$PBB,,, | Performed by: UROLOGY

## 2023-03-22 PROCEDURE — 99999 PR PBB SHADOW E&M-EST. PATIENT-LVL IV: ICD-10-PCS | Mod: PBBFAC,,, | Performed by: UROLOGY

## 2023-03-22 RX ORDER — PHENAZOPYRIDINE HYDROCHLORIDE 200 MG/1
200 TABLET, FILM COATED ORAL 3 TIMES DAILY PRN
Qty: 21 TABLET | Refills: 0 | Status: SHIPPED | OUTPATIENT
Start: 2023-03-22 | End: 2023-03-29

## 2023-03-22 NOTE — PROGRESS NOTES
[facility] - [facility]rology   Clinic Note    SUBJECTIVE:     Chief Complaint   Patient presents with    Benign Prostatic Hypertrophy     Post Op       Referral from: No ref. provider found.    History of Present Illness:  Gunner Farr is a 68 y.o. male who presents to clinic for BPH.    [facility].  The patient desires either Rezum or HoLEP which are not [facility].  He reports very bothersome urgency frequency and nocturia.  Does have a history of prostatitis and has been treated with antibiotics.  Additionally reports he had elevated PSA and had an MRI negative biopsy.  He did not bring these records with him and they were not faxed over.  Has such significantly bothered symptoms that he desires intervention.  He is interested in an intervention that could be ejaculation sparing.  He does report good sexual function with erections and ejaculations in his interested in preserving this.    02/09/2023  Patient is here for follow-up.  He brings his records with him which reveals an MRI that was performed that showed that he had a 33 cc prostate.  He had a cystoscopy performed today which revealed bilateral lobar hypertrophy and no median lobe.  He does continue to have some dysuria.  Strongly desires intervention.    03/22/2023  Patient underwent resume on February 28, 2023.  He reports since then he has noticed an improvement in his voiding symptoms.  Has had intermittent hematuria and dysuria.  He does feel like there has been a slight improvement in his symptoms however not quite where he would like to be yet.  He has not had sexual intercourse since the procedure.    Patient endorses no additional complaints at this time.    Past Medical History:   Diagnosis Date    GERD (gastroesophageal reflux disease)     Hypertension     Prostate disease        Past Surgical History:   Procedure Laterality Date    APPENDECTOMY      CYSTOSCOPY WITH TRANSURETHRAL DESTRUCTION OF  PROSTATE,RFA N/A 2/28/2023    Procedure: CYSTOSCOPY WITH TRANSURETHRAL DESTRUCTION OF PROSTATE,RFA;  Surgeon: Horace Winchester MD;  Location: [facility];  Service: Urology;  Laterality: N/A;       Family History   Problem Relation Age of Onset    Lung disease Mother     Prostatitis Father        Social History     Tobacco Use    Smoking status: Never    Smokeless tobacco: Never   Substance Use Topics    Alcohol use: Yes     Comment: occassional    Drug use: No       Current Outpatient Medications on File Prior to Visit   Medication Sig Dispense Refill    atenolol (TENORMIN) 50 MG tablet Take 50 mg by mouth once daily.      diazepam (VALIUM) 5 MG tablet Take 1 tablet (5 mg total) by mouth every 8 (eight) hours. 15 tablet 0    fluticasone (FLONASE) 50 mcg/actuation nasal spray 1 spray by Each Nare route 2 (two) times daily as needed for Rhinitis. (Patient not taking: Reported on 2/9/2023) 15 g 0    ibuprofen (ADVIL,MOTRIN) 800 MG tablet Take 1 tablet (800 mg total) by mouth 3 (three) times daily as needed for Pain. (Patient not taking: Reported on 3/22/2023) 90 tablet 0    loratadine (CLARITIN) 10 mg tablet Take 1 tablet (10 mg total) by mouth once daily. 30 tablet 2    meloxicam (MOBIC) 7.5 MG tablet Take 1 tablet (7.5 mg total) by mouth once daily. (Patient not taking: Reported on 2/4/2020) 7 tablet 0    methocarbamol (ROBAXIN) 750 MG Tab Take 500 mg by mouth 4 (four) times daily.      morphine (MSIR) 15 MG tablet Take 1 tablet (15 mg total) by mouth every 12 (twelve) hours as needed for Pain (break through pain). (Patient not taking: Reported on 2/4/2020) 6 tablet 0    omeprazole (PRILOSEC) 20 MG capsule Take 1 capsule (20 mg total) by mouth once daily. 30 capsule 0    oxybutynin (DITROPAN) 5 MG Tab Take 1 tablet (5 mg total) by mouth 3 (three) times daily as needed (Bladder spasm, stent pain). (Patient not taking: Reported on 3/22/2023) 90 tablet 0    oxyCODONE-acetaminophen (PERCOCET) 5-325 mg per tablet Take 1 tablet  "by mouth every 4 (four) hours as needed for Pain. (Patient not taking: Reported on 3/22/2023) 12 tablet 0    tamsulosin (FLOMAX) 0.4 mg Cap Take 0.4 mg by mouth once daily.      terazosin (HYTRIN) 2 MG capsule Take 2 mg by mouth every evening.      traMADol (ULTRAM) 50 mg tablet Take 1 tablet (50 mg total) by mouth every 4 (four) hours as needed for Pain. (Patient not taking: Reported on 2/4/2020) 30 tablet 0     No current facility-administered medications on file prior to visit.       Review of patient's allergies indicates:   Allergen Reactions    Hydrocodone-acetaminophen Shortness Of Breath     Shortness of breath^  Shortness of breath^      Caffeine      Anxiety^difficulty breathing  Anxiety^difficulty breathing         Review of Systems:  A review of 10+ systems was conducted with pertinent positive and negative findings documented in HPI with all other systems reviewed and negative.    OBJECTIVE:     Estimated body mass index is 23.04 kg/m² as calculated from the following:    Height as of this encounter: 5' 6" (1.676 m).    Weight as of this encounter: 64.8 kg (142 lb 12 oz).    Vital Signs (Most Recent)  Vitals:    03/22/23 1318   BP: 129/79   Pulse: 80       Physical Exam:  GENERAL: patient sitting comfortably  HEENT: normocephalic  NECK: supple, no JVD  PULM: normal chest rise, no increased WOB  HEART: non-diaphoretic  ABDO: soft, nondistended, nontender  BACK: no CVA tenderness bilaterally  SKIN: warm, dry, well perfused  EXT: no bruising or edema  NEURO: grossly normal with no focal deficits  PSYCH: appropriate mood and affect    Genitourinary Exam:  deferred    Lab Results   Component Value Date    BUN 17 02/28/2023    CREATININE 1.1 02/28/2023    WBC 9.59 12/25/2013    HGB 14.9 12/25/2013    HCT 42.2 12/25/2013     12/25/2013    AST 21 12/25/2013    ALT 19 12/25/2013    ALKPHOS 73 12/25/2013    ALBUMIN 3.9 12/25/2013    INR 1.0 12/25/2013        Imaging:  I have personally reviewed all " relevant imaging studies.    No results found for this or any previous visit (from the past 2160 hour(s)).  No results found for this or any previous visit (from the past 2160 hour(s)).  X-Ray Hand 2 View Left  Narrative: EXAMINATION:  XR HAND 2 VIEW LEFT    CLINICAL HISTORY:  Fracture of unspecified phalanx of left ring finger, initial encounter for open fracture    TECHNIQUE:  XR HAND 2 VIEW LEFT    COMPARISON:  11/24/2019    FINDINGS:  Compared with the prior study there has been no evidence of interval healing the nondisplaced fracture of the distal tuft of the distal phalanx of the 4th digit.  The fracture margins are smoother and the overlying soft tissue injury has healed.  Impression: No evidence of healing of nondisplaced fracture of the distal tuft of the 4th phalanx    Electronically signed by: Jaron Holden  Date:    01/07/2020  Time:    11:43       PSA:  No results found for: PSA, PSADIAG, PSATOTAL, PSAFREE    Testosterone:  No results found for: TOTALTESTOST, TOTALTESTOST, TESTOSTERONE     ASSESSMENT     1. BPH with urinary obstruction          PLAN:     BPH with urinary obstruction s/p ReZum    - we discussed the typical course postoperatively for patient with Rezum and how symptoms may take up to 12 weeks to improve.  Pyridium was refilled.  All questions were answered.  Patient will see me back in 3 months with a flow and PVR study.  If his symptoms do not improve to a point that he is happy with we could consider cystoscopy or urodynamics and could consider alternative treatment versus re-treatment.    Horace Winchester MD  Urology  [facility]    Disclaimer: This note has been generated using voice-recognition software. There may be typographical errors that have been missed during proof-reading.

## 2023-04-06 ENCOUNTER — PATIENT MESSAGE (OUTPATIENT)
Dept: RESEARCH | Facility: HOSPITAL | Age: 68
End: 2023-04-06
Payer: OTHER GOVERNMENT

## 2023-05-01 ENCOUNTER — PATIENT MESSAGE (OUTPATIENT)
Dept: RESEARCH | Facility: HOSPITAL | Age: 68
End: 2023-05-01
Payer: MEDICARE

## 2023-06-26 ENCOUNTER — OFFICE VISIT (OUTPATIENT)
Dept: UROLOGY | Facility: CLINIC | Age: 68
End: 2023-06-26
Payer: OTHER GOVERNMENT

## 2023-06-26 VITALS
HEIGHT: 66 IN | HEART RATE: 75 BPM | WEIGHT: 145.63 LBS | SYSTOLIC BLOOD PRESSURE: 138 MMHG | DIASTOLIC BLOOD PRESSURE: 82 MMHG | BODY MASS INDEX: 23.41 KG/M2

## 2023-06-26 DIAGNOSIS — N40.1 BPH WITH URINARY OBSTRUCTION: Primary | ICD-10-CM

## 2023-06-26 DIAGNOSIS — R10.2 CHRONIC PELVIC PAIN IN MALE: ICD-10-CM

## 2023-06-26 DIAGNOSIS — R35.1 NOCTURIA: ICD-10-CM

## 2023-06-26 DIAGNOSIS — N39.8 VOIDING DYSFUNCTION: ICD-10-CM

## 2023-06-26 DIAGNOSIS — G89.29 CHRONIC PELVIC PAIN IN MALE: ICD-10-CM

## 2023-06-26 DIAGNOSIS — N13.8 BPH WITH URINARY OBSTRUCTION: Primary | ICD-10-CM

## 2023-06-26 PROCEDURE — 99999 PR PBB SHADOW E&M-EST. PATIENT-LVL III: CPT | Mod: PBBFAC,,, | Performed by: UROLOGY

## 2023-06-26 PROCEDURE — 99213 OFFICE O/P EST LOW 20 MIN: CPT | Mod: PBBFAC,PO | Performed by: UROLOGY

## 2023-06-26 PROCEDURE — 99214 PR OFFICE/OUTPT VISIT, EST, LEVL IV, 30-39 MIN: ICD-10-PCS | Mod: S$PBB,,, | Performed by: UROLOGY

## 2023-06-26 PROCEDURE — 99999 PR PBB SHADOW E&M-EST. PATIENT-LVL III: ICD-10-PCS | Mod: PBBFAC,,, | Performed by: UROLOGY

## 2023-06-26 PROCEDURE — 99214 OFFICE O/P EST MOD 30 MIN: CPT | Mod: S$PBB,,, | Performed by: UROLOGY

## 2023-06-26 RX ORDER — OXYBUTYNIN CHLORIDE 10 MG/1
10 TABLET, EXTENDED RELEASE ORAL DAILY
Qty: 30 TABLET | Refills: 11 | Status: SHIPPED | OUTPATIENT
Start: 2023-06-26 | End: 2024-06-25

## 2023-06-26 NOTE — PROGRESS NOTES
[facility] - Urology   Clinic Note    SUBJECTIVE:     Chief Complaint   Patient presents with    Benign Prostatic Hypertrophy       Referral from: No ref. provider found.    History of Present Illness:  Gunner Farr is a 68 y.o. male who presents to clinic for BPH.    [facility]  The patient desires either Rezum or HoLEP which are not offered [facility].  He reports very bothersome urgency frequency and nocturia.  Does have a history of prostatitis and has been treated with antibiotics.  Additionally reports he had elevated PSA and had an MRI negative biopsy.  He did not bring these records with him and they were not faxed over.  Has such significantly bothered symptoms that he desires intervention.  He is interested in an intervention that could be ejaculation sparing.  He does report good sexual function with erections and ejaculations in his interested in preserving this.    02/09/2023  Patient is here for follow-up.  He brings his records with him which reveals an MRI that was performed that showed that he had a 33 cc prostate.  He had a cystoscopy performed today which revealed bilateral lobar hypertrophy and no median lobe.  He does continue to have some dysuria.  Strongly desires intervention.    03/22/2023  Patient underwent resume on February 28, 2023.  He reports since then he has noticed an improvement in his voiding symptoms.  Has had intermittent hematuria and dysuria.  He does feel like there has been a slight improvement in his symptoms however not quite where he would like to be yet.  He has not had sexual intercourse since the procedure.    06/26/2023  Here for follow-up.  He does continue to report a mild pelvic discomfort however he reports his symptoms from a urination standpoint are significantly improved from preoperatively.  Does report he has a strong stream.  Reports minimal straining.  He does report that he still has some urgency and  frequency and nocturia 3 times per night.  Did fill out an AUA symptom score which shows a score of 21 and bother score of 4 however upon questioning he does not seem to have symptoms this severe.  He reports he is not taking any prostate medication at this time.    IPSS Questionnaire (AUA-SS):  Over the past month    1)  Incomplete Emptying - How often have you had a sensation of not emptying your bladder completely after you finish urinating?  3 - About half the time   2)  Frequency - How often have you had to urinate again less than two hours after you finished urinating? 3 - About half the time   3)  Intermittency - How often have you found you stopped and started again several times when you urinated?  3 - About half the time   4) Urgency - How difficult have you found it to postpone urination?  3 - About half the time   5) Weak Stream - How often have you had a weak urinary stream?  2 - Less than half the time   6) Straining  - How often have you had to push or strain to begin urination?  4 - More than half the time   7) Nocturia - How many times did you most typically get up to urinate from the time you went to bed until the time you got up in the morning?  3 - 3 times   Total score:  0-7 mild, 8-19 moderate, 20-35 severe 21   Quality of Life:  4 - Mostly Dissatisfied     Uroflow:  Qmax: 19.5 mL/sec  Qav.9 mL/sec  Voided: 184 mL  PVR: 0 mL     Patient endorses no additional complaints at this time.    Past Medical History:   Diagnosis Date    GERD (gastroesophageal reflux disease)     Hypertension     Prostate disease        Past Surgical History:   Procedure Laterality Date    APPENDECTOMY      CYSTOSCOPY WITH TRANSURETHRAL DESTRUCTION OF PROSTATE,RFA N/A 2023    Procedure: CYSTOSCOPY WITH TRANSURETHRAL DESTRUCTION OF PROSTATE,RFA;  Surgeon: Horace Winchester MD;  Location: [facility];  Service: Urology;  Laterality: N/A;       Family History   Problem Relation Age of Onset    Lung disease Mother      Prostatitis Father        Social History     Tobacco Use    Smoking status: Never    Smokeless tobacco: Never   Substance Use Topics    Alcohol use: Yes     Comment: occassional    Drug use: No       Current Outpatient Medications on File Prior to Visit   Medication Sig Dispense Refill    atenolol (TENORMIN) 50 MG tablet Take 50 mg by mouth once daily.      methocarbamol (ROBAXIN) 750 MG Tab Take 500 mg by mouth 4 (four) times daily.      diazepam (VALIUM) 5 MG tablet Take 1 tablet (5 mg total) by mouth every 8 (eight) hours. 15 tablet 0    fluticasone (FLONASE) 50 mcg/actuation nasal spray 1 spray by Each Nare route 2 (two) times daily as needed for Rhinitis. (Patient not taking: Reported on 2/9/2023) 15 g 0    loratadine (CLARITIN) 10 mg tablet Take 1 tablet (10 mg total) by mouth once daily. 30 tablet 2    meloxicam (MOBIC) 7.5 MG tablet Take 1 tablet (7.5 mg total) by mouth once daily. (Patient not taking: Reported on 2/4/2020) 7 tablet 0    morphine (MSIR) 15 MG tablet Take 1 tablet (15 mg total) by mouth every 12 (twelve) hours as needed for Pain (break through pain). (Patient not taking: Reported on 2/4/2020) 6 tablet 0    omeprazole (PRILOSEC) 20 MG capsule Take 1 capsule (20 mg total) by mouth once daily. 30 capsule 0    oxyCODONE-acetaminophen (PERCOCET) 5-325 mg per tablet Take 1 tablet by mouth every 4 (four) hours as needed for Pain. (Patient not taking: Reported on 3/22/2023) 12 tablet 0    traMADol (ULTRAM) 50 mg tablet Take 1 tablet (50 mg total) by mouth every 4 (four) hours as needed for Pain. (Patient not taking: Reported on 2/4/2020) 30 tablet 0    [DISCONTINUED] oxybutynin (DITROPAN) 5 MG Tab Take 1 tablet (5 mg total) by mouth 3 (three) times daily as needed (Bladder spasm, stent pain). (Patient not taking: Reported on 3/22/2023) 90 tablet 0    [DISCONTINUED] tamsulosin (FLOMAX) 0.4 mg Cap Take 0.4 mg by mouth once daily.      [DISCONTINUED] terazosin (HYTRIN) 2 MG capsule Take 2 mg by mouth  "every evening.       No current facility-administered medications on file prior to visit.       Review of patient's allergies indicates:   Allergen Reactions    Hydrocodone-acetaminophen Shortness Of Breath     Shortness of breath^  Shortness of breath^      Caffeine      Anxiety^difficulty breathing  Anxiety^difficulty breathing         Review of Systems:  A review of 10+ systems was conducted with pertinent positive and negative findings documented in HPI with all other systems reviewed and negative.    OBJECTIVE:     Estimated body mass index is 23.5 kg/m² as calculated from the following:    Height as of this encounter: 5' 6" (1.676 m).    Weight as of this encounter: 66 kg (145 lb 9.8 oz).    Vital Signs (Most Recent)  Vitals:    06/26/23 1351   BP: 138/82   Pulse: 75       Physical Exam:  GENERAL: patient sitting comfortably  HEENT: normocephalic  NECK: supple, no JVD  PULM: normal chest rise, no increased WOB  HEART: non-diaphoretic  ABDO: soft, nondistended, nontender  BACK: no CVA tenderness bilaterally  SKIN: warm, dry, well perfused  EXT: no bruising or edema  NEURO: grossly normal with no focal deficits  PSYCH: appropriate mood and affect    Genitourinary Exam:  deferred    Lab Results   Component Value Date    BUN 17 02/28/2023    CREATININE 1.1 02/28/2023    WBC 9.59 12/25/2013    HGB 14.9 12/25/2013    HCT 42.2 12/25/2013     12/25/2013    AST 21 12/25/2013    ALT 19 12/25/2013    ALKPHOS 73 12/25/2013    ALBUMIN 3.9 12/25/2013    INR 1.0 12/25/2013        Imaging:  I have personally reviewed all relevant imaging studies.    No results found for this or any previous visit (from the past 2160 hour(s)).  No results found for this or any previous visit (from the past 2160 hour(s)).  X-Ray Hand 2 View Left  Narrative: EXAMINATION:  XR HAND 2 VIEW LEFT    CLINICAL HISTORY:  Fracture of unspecified phalanx of left ring finger, initial encounter for open fracture    TECHNIQUE:  XR HAND 2 VIEW " LEFT    COMPARISON:  11/24/2019    FINDINGS:  Compared with the prior study there has been no evidence of interval healing the nondisplaced fracture of the distal tuft of the distal phalanx of the 4th digit.  The fracture margins are smoother and the overlying soft tissue injury has healed.  Impression: No evidence of healing of nondisplaced fracture of the distal tuft of the 4th phalanx    Electronically signed by: Jaron Holden  Date:    01/07/2020  Time:    11:43       PSA:  No results found for: PSA, PSADIAG, PSATOTAL, PSAFREE    Testosterone:  No results found for: TOTALTESTOST, TOTALTESTOST, TESTOSTERONE     ASSESSMENT     1. BPH with urinary obstruction    2. Voiding dysfunction    3. Nocturia    4. Chronic pelvic pain in male        PLAN:     BPH with urinary obstruction s/p ReZum  Voiding dysfunction  Chronic pelvic pain    - patient's flow is significantly improved from preoperative.  Reports improved symptoms however his AUA symptom score does not match his verbal description of his symptoms.  He feels like he is much improved.  Chronic pelvic pain and urinary urgency could be due to chronic pelvic pain syndrome or overactive bladder.  We will initiate oxybutynin 10 mg once daily for his irritative lower urinary tract symptoms.  We will also place a referral for pelvic floor PT.  Follow up in 3 months.    Horace Winchester MD  Urology  [facility]  [facility]    Disclaimer: This note has been generated using voice-recognition software. There may be typographical errors that have been missed during proof-reading.

## 2023-10-03 ENCOUNTER — CLINICAL SUPPORT (OUTPATIENT)
Dept: REHABILITATION | Facility: HOSPITAL | Age: 68
End: 2023-10-03
Payer: MEDICARE

## 2023-10-03 DIAGNOSIS — N39.8 VOIDING DYSFUNCTION: ICD-10-CM

## 2023-10-03 DIAGNOSIS — G89.29 CHRONIC PELVIC PAIN IN MALE: ICD-10-CM

## 2023-10-03 DIAGNOSIS — R10.2 CHRONIC PELVIC PAIN IN MALE: ICD-10-CM

## 2023-10-03 DIAGNOSIS — M62.89 PELVIC FLOOR DYSFUNCTION: ICD-10-CM

## 2023-10-03 DIAGNOSIS — M62.838 MUSCLE SPASM: ICD-10-CM

## 2023-10-03 DIAGNOSIS — R10.2 PELVIC PAIN: ICD-10-CM

## 2023-10-03 PROCEDURE — 97140 MANUAL THERAPY 1/> REGIONS: CPT | Mod: PO

## 2023-10-03 PROCEDURE — 97530 THERAPEUTIC ACTIVITIES: CPT | Mod: PO

## 2023-10-03 PROCEDURE — 97161 PT EVAL LOW COMPLEX 20 MIN: CPT | Mod: PO
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
## 2023-10-03 NOTE — PATIENT INSTRUCTIONS
Home Exercise Program: 10/03/2023     SCAR MOBILIZATION  - Gently massage your scar in all directions both superficially along the skin and deeply.   - Massage the area AROUND your scar, as well as directly on it.   - Do not pull your scar apart with both hands - Use one hand/finger to anchor and use the other to pull along or across the scar.  - Apply only as much pressure as you can tolerate, so that you can do this again the next day.     Do for 5 minutes every day.

## 2023-10-10 ENCOUNTER — CLINICAL SUPPORT (OUTPATIENT)
Dept: REHABILITATION | Facility: HOSPITAL | Age: 68
End: 2023-10-10
Payer: MEDICARE

## 2023-10-10 DIAGNOSIS — M62.89 PELVIC FLOOR DYSFUNCTION: ICD-10-CM

## 2023-10-10 DIAGNOSIS — M62.838 MUSCLE SPASM: ICD-10-CM

## 2023-10-10 DIAGNOSIS — R10.2 PELVIC PAIN: Primary | ICD-10-CM

## 2023-10-10 PROCEDURE — 97140 MANUAL THERAPY 1/> REGIONS: CPT | Mod: PO

## 2023-10-10 PROCEDURE — 97112 NEUROMUSCULAR REEDUCATION: CPT | Mod: PO

## 2023-10-10 PROCEDURE — 97530 THERAPEUTIC ACTIVITIES: CPT | Mod: PO

## 2023-10-10 NOTE — PROGRESS NOTES
[facility]   Physical Therapy Treatment Note      Name: Gunner Farr  Clinic Number: [id number]    Therapy Diagnosis:   Encounter Diagnoses   Name Primary?    Pelvic pain Yes    Pelvic floor dysfunction     Muscle spasm      Physician: Horace Winchester MD    Visit Date: 10/10/2023    Physician Orders: PT Eval and Treat   Medical Diagnosis from Referral: voiding dysfunction and chronic pelvic pain  Evaluation Date: 10/3/2023  Authorization Period Expiration: 10/03/2024  Plan of Care Expiration: 12-26-23  Progress Note Due: 11-3-23  Visit # / Visits authorized: 1/ 20    FOTO: 1/3  Date last given: 10/3/2023    Time In: 845  Time Out: 930  Total Billable Time: 45 minutes    Precautions: Standard    Subjective     Pt reports: He has been working on his suprapubic soft tissue mobilization at home but feels like.  He reports burning pain in his penis.  He has also been straining   He was compliant with home exercise program.  Response to previous treatment: no issues reported   Functional change: no changes reported    Pain: not assessed  Location: pelvic floor     Constitutional Symptoms Review: The patient denies having any constitutional symptoms.       Objective      Objective Measures updated at progress report unless specified.     See EMR under MEDIA for written consent provided 10/10/2023  Chaperone: declined      RECTAL PELVIC FLOOR EXAM    EXTERNAL ASSESSMENT  Anus: WNL  Skin condition: skin looks within normal limits.  Palpable nodules noted below skin near anus bilaterally.  nb  Scarring: none  Sensation: WNL   Pain:   perianally especially over nodules   Voluntary contraction: visible lift  Voluntary relaxation: visible drop  Involuntary contraction: bulge  Bearing down: visible drop  Discharge: none       INTERNAL ASSESSMENT  EAS tone: WNL   Impaction: none   Pain: tender areas noted as follows: bilateral: coccygeous, obturator internus, levator ani and near prostate  Sensation: WNL    Muscle Bulk: hypertonus   Muscle Power: 3/5    Quality of contraction: slow relaxation and incomplete relaxation   Specificity: WNL  Coordination: WNL         Treatment   Gunner received the treatments listed below:    Pt verbally consents to intrarectal treatment today.  Signed consent form already on file.       Gunner received therapeutic exercises to develop  flexibility for 3 minutes including:   [x] Groin stretch 3x30 sec     Gunner received the following manual therapy techniques: to develop flexibility, extensibility, and desensitization for 22 minutes including:   [x] trigger point/myofascial release of intrarectal muscle(s) bilaterally  and soft tissue mobilization of external pelvic floor muscle(s) bilaterally   [] soft tissue mobilization of abdominal wall muscle(s)  and scar mobilization of abdominal scar tissue in all planes    Gunner participated in neuromuscular re-education activities to develop Coordination, Control, and Down training for 10 minutes including:   [x]pelvic floor relaxation/bulging training and pelvic floor relaxation speed after performing a kegel         Gunner participated in dynamic functional therapeutic activities to improve functional performance for 10  minutes, including:  [x] Plan of care review  [x] Anatomy review and discussion of the anatomy on current level of function   [x] Home exercise program issued and reviewed   [x] Reviewed ointments and pastes that patient brought it.      Home Exercises Provided and Patient Education Provided     Education provided: See above  Discussed progression of plan of care with patient; educated pt in activity modification; reviewed HEP with pt. Pt demonstrated and verbalized understanding of all instruction and was provided with a handout of HEP (see Patient Instructions).    Written Home Exercises Provided: yes.  Exercises were reviewed and Gunner was able to demonstrate them prior to the end of the session.  Gunner  demonstrated good  understanding of the education provided.     See EMR under Patient Instructions for exercises provided 10/10/2023.    Assessment     Patient consents to intrarectal pelvic floor muscle(s) assessment today. He is noted to have pelvic floor muscle(s) dysfunction including elevated resting tension, pain with palpation and coordination deficits.    Treatment initiated to address myofascial restrictions and work on pelvic floor muscle(s) downtrianing and coordination.  Pt will continue to benefit from skilled outpatient physical therapy to address the deficits listed in the problem list box on initial evaluation, provide pt/family education and to maximize pt's level of independence in the home and community environment.       Gunner Is progressing well towards his goals.   Pt prognosis is Excellent.     Pt will continue to benefit from skilled outpatient physical therapy to address the deficits listed in the problem list box on initial evaluation, provide pt/family education and to maximize pt's level of independence in the home and community environment.     Pt's spiritual, cultural and educational needs considered and pt agreeable to plan of care and goals.     Anticipated barriers to physical therapy: work schedule    Goals:   Short Term Goals: 4 weeks   Patient to demonstrate proper use of urge delay strategies to help reduce urge urinary incontinence with activities of daily living.   Pt to demonstrate being able to correctly and consistently perform a kegel which is needed  to increase pelvic floor muscle coordination and strength needed for continence.  Pt to report a decrease in urinary frequency from every 1 hours to no more than once every 3 hour(s) to improve ability to participate in social activities.  Pt to demonstrate independence with performing bowel massage to help with gut motility.   Patient to report no longer needing to strain to urinate 50% of the time to demonstrate a return  towards prior level of function.        Long Term Goals: 12 weeks   Pt to be discharged with home plan for carry over after discharge.    Pt to be able to delay the urge to urinate at least 15 minutes with a strong urge to urinate in order to make it to the bathroom without leaking.  Pt to report a decrease in nocturia to no more than 2x/night for improved restorative sleep.    Patient to report no longer needing to strain to urinate 85% of the time to demonstrate a return towards prior level of function.   Pt to report being able to complete a full day at work without significant increase in pain to demonstrate a return towards prior level of function.  Pt to demonstrate an improved score in the FOTO Urinary Problem survey  to at least 64 to demonstrate improving pelvic floor muscle(s) function with activities of daily living.                PLAN      Plan of care Certification: 10/3/2023 to 12-26-23.     Outpatient Physical Therapy 2 times weekly for 12 weeks to include the following interventions: therapeutic exercises, therapeutic activity, neuromuscular re-education, gait training, manual therapy, modalities PRN, patient/family education, dry needling, and self care/home management    Continue with established Plan of Care, working toward established PT goals.      At next visit: work on pelvic floor muscle(s) downtraining     Nikki Benitez, PT

## 2023-10-10 NOTE — PATIENT INSTRUCTIONS
"Home Exercise Program: 10/10/2023      "CHECK IN" WITH YOUR PELVIC FLOOR    Every hour, "check in" with your pelvic floor.  Is it tight and contracted?  Is it relaxed and dropped?    Take 10 seconds and try to release any tension in the pelvic floor muscles and external anal sphincter. You can try one or more of the following techniques.   Diaphragmatic Breathing  Mindfulness  Body scan   Gently pushing out your pelvic floor     Then return to your regular tasks.    Do this every hour throughout the day in any position.                              \           "

## 2023-10-17 ENCOUNTER — CLINICAL SUPPORT (OUTPATIENT)
Dept: REHABILITATION | Facility: HOSPITAL | Age: 68
End: 2023-10-17
Payer: MEDICARE

## 2023-10-17 DIAGNOSIS — M62.89 PELVIC FLOOR DYSFUNCTION: ICD-10-CM

## 2023-10-17 DIAGNOSIS — R10.2 PELVIC PAIN: Primary | ICD-10-CM

## 2023-10-17 DIAGNOSIS — M62.838 MUSCLE SPASM: ICD-10-CM

## 2023-10-17 PROCEDURE — 97140 MANUAL THERAPY 1/> REGIONS: CPT | Mod: PO

## 2023-10-17 PROCEDURE — 97530 THERAPEUTIC ACTIVITIES: CPT | Mod: PO

## 2023-10-17 NOTE — PROGRESS NOTES
"[facility]   Physical Therapy Treatment Note      Name: Gunner Farr  Clinic Number: [id number]    Therapy Diagnosis:   Encounter Diagnoses   Name Primary?    Pelvic pain Yes    Pelvic floor dysfunction     Muscle spasm      Physician: Horace Winchester MD    Visit Date: 10/17/2023    Physician Orders: PT Eval and Treat   Medical Diagnosis from Referral: voiding dysfunction and chronic pelvic pain  Evaluation Date: 10/3/2023  Authorization Period Expiration: 10/03/2024  Plan of Care Expiration: 12-26-23  Progress Note Due: 11-3-23  Visit # / Visits authorized: 1/ 20    FOTO: 1/3  Date last given: 10/3/2023    Time In: 850  Time Out: 930  Total Billable Time: 40 minutes    Precautions: Standard    Subjective     Pt reports: He has been working on his suprapubic soft tissue mobilization at home but feels like something is sensitive and sharp right above the pubic bone "kind of like when your bladder is irritated." He reports burning pain in his penis.  Has been trying to limit fluids at night.      He was compliant with home exercise program.  Response to previous treatment: no issues reported   Functional change: no changes reported    Pain: not assessed  Location: pelvic floor     Constitutional Symptoms Review: The patient denies having any constitutional symptoms.       Objective      Objective Measures updated at progress report unless specified.     See EMR under MEDIA for written consent provided 10/17/2023  Chaperone: declined      RECTAL PELVIC FLOOR EXAM    EXTERNAL ASSESSMENT  Anus: WNL  Skin condition: skin looks within normal limits.  Palpable nodules noted below skin near anus bilaterally.  nb  Scarring: none  Sensation: WNL   Pain:   perianally especially over nodules   Voluntary contraction: visible lift  Voluntary relaxation: visible drop  Involuntary contraction: bulge  Bearing down: visible drop  Discharge: none       INTERNAL ASSESSMENT  EAS tone: WNL   Impaction: none "   Pain: tender areas noted as follows: bilateral: coccygeous, obturator internus, levator ani and near prostate  Sensation: WNL   Muscle Bulk: hypertonus   Muscle Power: 3/5    Quality of contraction: slow relaxation and incomplete relaxation   Specificity: WNL  Coordination: WNL         Treatment   Gunner received the treatments listed below:    Pt verbally consents to intrarectal treatment today.  Signed consent form already on file.       Gunner received therapeutic exercises to develop  flexibility for 00 minutes including:   [] Groin stretch 3x30 sec     Gunner received the following manual therapy techniques: to develop flexibility, extensibility, and desensitization for 30 minutes including:   [] trigger point/myofascial release of intrarectal muscle(s) bilaterally  and soft tissue mobilization of external pelvic floor muscle(s) bilaterally   [x] soft tissue mobilization of abdominal wall muscle(s)  and scar mobilization of abdominal scar tissue in all planes  [x] Anterior pelvic floor muscle(s) myofascial release bilaterally     Gunner participated in neuromuscular re-education activities to develop Coordination, Control, and Down training for 00 minutes including:   []pelvic floor relaxation/bulging training and pelvic floor relaxation speed after performing a kegel         Gunner participated in dynamic functional therapeutic activities to improve functional performance for 10  minutes, including:  [x] Plan of care review  [x] Anatomy review and discussion of the anatomy on current level of function   [x] Home exercise program issued and reviewed   [x] Reviewed ointments and pastes that patient uses for rectal hygiene   [x] Fluid restriction before bedtime edu     Home Exercises Provided and Patient Education Provided     Education provided: See above  Discussed progression of plan of care with patient; educated pt in activity modification; reviewed HEP with pt. Pt demonstrated and verbalized  understanding of all instruction and was provided with a handout of HEP (see Patient Instructions).    Written Home Exercises Provided: yes.  Exercises were reviewed and Gunner was able to demonstrate them prior to the end of the session.  Gunner demonstrated good  understanding of the education provided.     See EMR under Patient Instructions for exercises provided 10/10/2023.    Assessment      Patient consents to external anterior pelvic floor muscle(s) assessment today. Was not able to replicate his testicular or penile pain with palpation.  Also worked on abdominal wall mobilization and scar mobilization.  Was able to replicate testicular pain with suprapubic palpation on the right.  Pt will continue to benefit from skilled outpatient physical therapy to address the deficits listed in the problem list box on initial evaluation, provide pt/family education and to maximize pt's level of independence in the home and community environment.       Gunner Is progressing well towards his goals.   Pt prognosis is Excellent.     Pt will continue to benefit from skilled outpatient physical therapy to address the deficits listed in the problem list box on initial evaluation, provide pt/family education and to maximize pt's level of independence in the home and community environment.     Pt's spiritual, cultural and educational needs considered and pt agreeable to plan of care and goals.     Anticipated barriers to physical therapy: work schedule    Goals:   Short Term Goals: 4 weeks   Patient to demonstrate proper use of urge delay strategies to help reduce urge urinary incontinence with activities of daily living.   Pt to demonstrate being able to correctly and consistently perform a kegel which is needed  to increase pelvic floor muscle coordination and strength needed for continence.  Pt to report a decrease in urinary frequency from every 1 hours to no more than once every 3 hour(s) to improve ability to participate  in social activities.  Pt to demonstrate independence with performing bowel massage to help with gut motility.   Patient to report no longer needing to strain to urinate 50% of the time to demonstrate a return towards prior level of function.        Long Term Goals: 12 weeks   Pt to be discharged with home plan for carry over after discharge.    Pt to be able to delay the urge to urinate at least 15 minutes with a strong urge to urinate in order to make it to the bathroom without leaking.  Pt to report a decrease in nocturia to no more than 2x/night for improved restorative sleep.    Patient to report no longer needing to strain to urinate 85% of the time to demonstrate a return towards prior level of function.   Pt to report being able to complete a full day at work without significant increase in pain to demonstrate a return towards prior level of function.  Pt to demonstrate an improved score in the FOTO Urinary Problem survey  to at least 64 to demonstrate improving pelvic floor muscle(s) function with activities of daily living.                PLAN      Plan of care Certification: 10/3/2023 to 12-26-23.     Outpatient Physical Therapy 2 times weekly for 12 weeks to include the following interventions: therapeutic exercises, therapeutic activity, neuromuscular re-education, gait training, manual therapy, modalities PRN, patient/family education, dry needling, and self care/home management    Continue with established Plan of Care, working toward established PT goals.      At next visit: work on pelvic floor muscle(s) downtraining     Nikki Benitez, PT

## 2023-10-24 ENCOUNTER — CLINICAL SUPPORT (OUTPATIENT)
Dept: REHABILITATION | Facility: HOSPITAL | Age: 68
End: 2023-10-24
Payer: MEDICARE

## 2023-10-24 DIAGNOSIS — M62.838 MUSCLE SPASM: ICD-10-CM

## 2023-10-24 DIAGNOSIS — R10.2 PELVIC PAIN: Primary | ICD-10-CM

## 2023-10-24 DIAGNOSIS — M62.89 PELVIC FLOOR DYSFUNCTION: ICD-10-CM

## 2023-10-24 PROCEDURE — 97140 MANUAL THERAPY 1/> REGIONS: CPT | Mod: PO

## 2023-10-24 PROCEDURE — 97530 THERAPEUTIC ACTIVITIES: CPT | Mod: PO

## 2023-10-24 NOTE — PROGRESS NOTES
[facility]   Physical Therapy Treatment Note      Name: Gunner Farr  Clinic Number: [id number]    Therapy Diagnosis:   Encounter Diagnoses   Name Primary?    Pelvic pain Yes    Pelvic floor dysfunction     Muscle spasm      Physician: Horace Winchester MD    Visit Date: 10/24/2023    Physician Orders: PT Eval and Treat   Medical Diagnosis from Referral: voiding dysfunction and chronic pelvic pain  Evaluation Date: 10/3/2023  Authorization Period Expiration: 10/03/2024  Plan of Care Expiration: 12-26-23  Progress Note Due: 11-3-23  Visit # / Visits authorized: 4/ 20    FOTO: 1/3  Date last given: 10/3/2023    Time In: 854  Time Out: 932  Total Billable Time: 38 minutes    Precautions: Standard    Subjective     Pt reports: He reports [place] and has been noticing pain that shoots from his testicles.  He brought some creams that he uses (ketoconazole and hydrocortisone).  He reports his pain is worse with driving.  Sometimes he feels like the pain is his skin and sometimes it feels like his muscles.  He reports a history of fungal infection.      He reports he still has urgency/frequency symptom(s).        He was compliant with home exercise program.  Response to previous treatment: no issues reported   Functional change: no changes reported    Pain: not assessed  Location: pelvic floor     Constitutional Symptoms Review: The patient denies having any constitutional symptoms.       Objective      Objective Measures updated at progress report unless specified.     Patient verbally consents to intrarectal treatment today.  See EMR under MEDIA for written consent provided 10/24/2023  Chaperone: declined      RECTAL PELVIC FLOOR EXAM    EXTERNAL ASSESSMENT  Anus: WNL  Skin condition: skin looks within normal limits.  Palpable nodules noted below skin near anus bilaterally.  nb  Scarring: none  Sensation: WNL   Pain:   perianally especially over nodules    Voluntary contraction: visible lift  Voluntary relaxation: visible drop  Involuntary contraction: bulge  Bearing down: visible drop  Discharge: none       INTERNAL ASSESSMENT  EAS tone: WNL   Impaction: none   Pain: tender areas noted as follows: bilateral: coccygeous, obturator internus, levator ani and near prostate (although decreased since initial assessment)  Sensation: WNL   Muscle Bulk: hypertonus (although decreased since initial assessment)  Muscle Power: 3/5    Quality of contraction: slow relaxation and incomplete relaxation   Specificity: WNL  Coordination: WNL         Treatment   Gunner received the treatments listed below:    Pt verbally consents to intrarectal treatment today.  Signed consent form already on file.       Gunner received therapeutic exercises to develop  flexibility for 00 minutes including:   [] Groin stretch 3x30 sec     Gunner received the following manual therapy techniques: to develop flexibility, extensibility, and desensitization for 28 minutes including:   [x] trigger point/myofascial release of intrarectal muscle(s) bilaterally  and soft tissue mobilization of external pelvic floor muscle(s) bilaterally   [x] soft tissue mobilization of abdominal wall muscle(s)  and scar mobilization of abdominal scar tissue in all planes  [] Anterior pelvic floor muscle(s) myofascial release bilaterally   [x] Iliopsoas release bilaterally     Gunner participated in neuromuscular re-education activities to develop Coordination, Control, and Down training for 00 minutes including:   []pelvic floor relaxation/bulging training and pelvic floor relaxation speed after performing a kegel         Gunner participated in dynamic functional therapeutic activities to improve functional performance for 10  minutes, including:  [x] Plan of care review  [x] Anatomy review and discussion of the anatomy on current level of function   [] Home exercise program issued and reviewed   [x] Reviewed ointments  and pastes that patient uses for rectal hygiene   [] Fluid restriction before bedtime edu   [x] Genital hygiene/cleaning edu  [x] Edu on water intake for bladder health     Home Exercises Provided and Patient Education Provided     Education provided: See above  Discussed progression of plan of care with patient; educated pt in activity modification; reviewed HEP with pt. Pt demonstrated and verbalized understanding of all instruction and was provided with a handout of HEP (see Patient Instructions).    Written Home Exercises Provided: yes.  Exercises were reviewed and Gunner was able to demonstrate them prior to the end of the session.  Gunner demonstrated good  understanding of the education provided.     See EMR under Patient Instructions for exercises provided 10/10/2023.    Assessment      Patient consents to intrarectal pelvic floor muscle(s) assessment today. Worked on myofascial release of pelvic floor muscle(s) and hip flexors and long with abdominal scar tissue.  Also worked on abdominal wall mobilization and scar mobilization.  Discussed with patient following up with his dermatologist to review his medicated skin care routine around his anus.  Edu provided on bladder irritants and importance of hygiene.  Pt will continue to benefit from skilled outpatient physical therapy to address the deficits listed in the problem list box on initial evaluation, provide pt/family education and to maximize pt's level of independence in the home and community environment.       Gunner Is progressing well towards his goals.   Pt prognosis is Excellent.     Pt will continue to benefit from skilled outpatient physical therapy to address the deficits listed in the problem list box on initial evaluation, provide pt/family education and to maximize pt's level of independence in the home and community environment.     Pt's spiritual, cultural and educational needs considered and pt agreeable to plan of care and goals.      Anticipated barriers to physical therapy: work schedule    Goals:   Short Term Goals: 4 weeks   Patient to demonstrate proper use of urge delay strategies to help reduce urge urinary incontinence with activities of daily living.   Pt to demonstrate being able to correctly and consistently perform a kegel which is needed  to increase pelvic floor muscle coordination and strength needed for continence.  Pt to report a decrease in urinary frequency from every 1 hours to no more than once every 3 hour(s) to improve ability to participate in social activities.  Pt to demonstrate independence with performing bowel massage to help with gut motility.   Patient to report no longer needing to strain to urinate 50% of the time to demonstrate a return towards prior level of function.        Long Term Goals: 12 weeks   Pt to be discharged with home plan for carry over after discharge.    Pt to be able to delay the urge to urinate at least 15 minutes with a strong urge to urinate in order to make it to the bathroom without leaking.  Pt to report a decrease in nocturia to no more than 2x/night for improved restorative sleep.    Patient to report no longer needing to strain to urinate 85% of the time to demonstrate a return towards prior level of function.   Pt to report being able to complete a full day at work without significant increase in pain to demonstrate a return towards prior level of function.  Pt to demonstrate an improved score in the FOTO Urinary Problem survey  to at least 64 to demonstrate improving pelvic floor muscle(s) function with activities of daily living.                PLAN      Plan of care Certification: 10/3/2023 to 12-26-23.     Outpatient Physical Therapy 2 times weekly for 12 weeks to include the following interventions: therapeutic exercises, therapeutic activity, neuromuscular re-education, gait training, manual therapy, modalities PRN, patient/family education, dry needling, and self care/home  management    Continue with established Plan of Care, working toward established PT goals.      At next visit: work on pelvic floor muscle(s) downtraining     Nikki Benitez, PT

## 2023-10-24 NOTE — PATIENT INSTRUCTIONS
"HOW DIET MAY AFFECT YOUR BLADDER    Although there is no particular "diet" that can cure bladder control, there are certain dietary suggestions you can use to help control the problem.  Many people with bladder control problems decrease their intake of liquids in hope that they will need to urinate less frequently or have less urinary leakage.  While a decrease in liquid intake does result in a decrease in the volume of urine, the smaller amount of urine may be more highly concentrated.  Highly concentrated, dark yellow urine is irritating to the bladder surface and may actually cause you to go to the bathroom more frequently.  It also encourages the growth of bacteria, which may lead to infections resulting in incontinence.  You should not restrict fluids to control your bladder.    Some foods and beverages are thought to contribute to bladder leakage and irritability. However their effect on the bladder is not completely understood and you may want to see if eliminating one or all of these items improves your bladder control.  If you are unable to give them up completely, it is recommended that you use the following items in moderation:    Foods with acidic properties:  Alcoholic beverages  Tomato based products  Vinegar  Coffee (regular and decaf)  Tea (regular and decaf)  Concepcion  Citrus fruits and juices  Spicy foods  Caffeinated beverages  Cola  Milk  Food colorings and flavorings  Artificial sweeteners  Chocolate    Cigarette smoking is also irritating to the bladder surface and is associated with bladder cancer.  In addition, the coughing associated with smoking may lead to increased incontinent episodes.          Substitutions for Bladder Irritants  Although water is always the best beverage choice, grape and apple juice are thirst quenchers and are not as irritating to the bladder.    Low acid fruits:  pears, apricots, papaya, watermelon    For coffee drinkers: KAVA®  Postum®  Ktae®  Kaffree [place]®    For tea " drinkers: Non-citrus herbal    Sun brewed tea    Vitamin C substitute:  Calcium carbonate co-buffered with calcium ascorbate    Good Habits  Maintain a good fluid intake. Depending on your body size and environment, drink 4-8 cups (8 oz each) of fluid per day unless otherwise advised by your doctor. Not enough fluid creates a foul odor and dark color of the urine.  Typical dietary recommendations for fiber are between 25-35 grams per day. You should discuss your fiber needs with your physician, pharmacist or nutritionist.

## 2023-10-31 ENCOUNTER — CLINICAL SUPPORT (OUTPATIENT)
Dept: REHABILITATION | Facility: HOSPITAL | Age: 68
End: 2023-10-31
Payer: MEDICARE

## 2023-10-31 DIAGNOSIS — M62.838 MUSCLE SPASM: ICD-10-CM

## 2023-10-31 DIAGNOSIS — R10.2 PELVIC PAIN: Primary | ICD-10-CM

## 2023-10-31 DIAGNOSIS — M62.89 PELVIC FLOOR DYSFUNCTION: ICD-10-CM

## 2023-10-31 PROCEDURE — 97112 NEUROMUSCULAR REEDUCATION: CPT | Mod: PO

## 2023-10-31 PROCEDURE — 97140 MANUAL THERAPY 1/> REGIONS: CPT | Mod: PO

## 2023-10-31 PROCEDURE — 97530 THERAPEUTIC ACTIVITIES: CPT | Mod: PO

## 2023-10-31 NOTE — PATIENT INSTRUCTIONS
"Home Exercise Program: 10/31/2023    Abdominal Muscle Training:  Tighten your abdominal muscles without sucking in our pooching your belly.  Tighten and draw in your muscles.  Don't hold your breath!  It actually helps to exhale while your tighten.  "Blow out birthday candles."     Hold 5 seconds.  Repeat 10 times.  2 sets per day.                  "

## 2023-10-31 NOTE — PROGRESS NOTES
"[facility]   Physical Therapy Treatment Note      Name: Gunner Farr  Clinic Number: [id number]    Therapy Diagnosis:   Encounter Diagnoses   Name Primary?    Pelvic pain Yes    Pelvic floor dysfunction     Muscle spasm        Physician: Horace Winchester MD    Visit Date: 10/31/2023    Physician Orders: PT Eval and Treat   Medical Diagnosis from Referral: voiding dysfunction and chronic pelvic pain  Evaluation Date: 10/3/2023  Authorization Period Expiration: 10/03/2024  Plan of Care Expiration: 12-26-23  Progress Note Due: 11-3-23  Visit # / Visits authorized: 4/ 20    FOTO: 1/3  Date last given: 10/3/2023    Time In: 845  Time Out: 930  Total Billable Time:  45 minutes    Precautions: Standard    Subjective     Pt reports:  He reports he is having less testicle pain and also feels like his stream is starting to improve.  "I've been trying to stay away from coffee and tea, spices and I think that's helping my stream.  I still have a little bit urgency.  I'm trying to stay away from acid stuff."  He reports his rectal itching is improving also.  He continues to report dripping of the urine.      He reports his pain is worse with driving.  Sometimes he feels like the pain is his skin and sometimes it feels like his muscles.  He reports a history of fungal infection.      He reports he still has urgency/frequency symptom(s).        He was compliant with home exercise program.  Response to previous treatment: no issues reported   Functional change: no changes reported    Pain: not assessed  Location: pelvic floor     Constitutional Symptoms Review: The patient denies having any constitutional symptoms.       Objective      Objective Measures updated at progress report unless specified.     Patient verbally consents to intrarectal treatment today.  See EMR under MEDIA for written consent provided 10/31/2023  Chaperone: declined      RECTAL PELVIC FLOOR EXAM    EXTERNAL ASSESSMENT  Anus: " WNL  Skin condition: skin looks within normal limits.  Palpable nodules noted below skin near anus bilaterally.  nb  Scarring: none  Sensation: WNL   Pain:   perianally especially over nodules   Voluntary contraction: visible lift  Voluntary relaxation: visible drop  Involuntary contraction: bulge  Bearing down: visible drop  Discharge: none       INTERNAL ASSESSMENT  EAS tone: WNL   Impaction: none   Pain: tender areas noted as follows: bilateral: coccygeous, obturator internus, levator ani and near prostate (although decreased since initial assessment)  Sensation: WNL   Muscle Bulk: hypertonus (although decreased since initial assessment)  Muscle Power: 3/5    Quality of contraction: slow relaxation and incomplete relaxation   Specificity: WNL  Coordination: WNL         Treatment   Gunner received the treatments listed below:    Pt verbally consents to intrarectal treatment today.  Signed consent form already on file.       Gunner received therapeutic exercises to develop  flexibility for 00 minutes including:   [] Groin stretch 3x30 sec     Gunner received the following manual therapy techniques: to develop flexibility, extensibility, and desensitization for 20 minutes including:   [] trigger point/myofascial release of intrarectal muscle(s) bilaterally  and soft tissue mobilization of external pelvic floor muscle(s) bilaterally   [x] soft tissue mobilization of abdominal wall muscle(s)  and scar mobilization of abdominal scar tissue in all planes  [] Anterior pelvic floor muscle(s) myofascial release bilaterally   [x] Iliopsoas release bilaterally     Gunner participated in neuromuscular re-education activities to develop Coordination, Control, and Down training for 17 minutes including:   []pelvic floor relaxation/bulging training and pelvic floor relaxation speed after performing a kegel   [x] Pt struggles to isolate TrA initially but improves with practice. Tactile and verbal cues required for correct  activation of TrA. Initially pt substitutes with rectus abdominus muscle but with mod verbal and tactile instruction is able to isolate out  lower abdominal muscles.    [x] Transverse abdominus muscle 5 sec x 10 reps  [x] Transverse abdominus muscle +BKFO/Marching x15 reps.   Cues needed for correct TA activation and coordination with kegel.    [x] Posterior pelvic tilt with cues to not use rectus abdominus x 15 reps          Gunner participated in dynamic functional therapeutic activities to improve functional performance for 8 minutes, including:  [x] Plan of care review  [x] Anatomy review and discussion of the anatomy on current level of function   [] Home exercise program issued and reviewed   [x] Reviewed ointments and pastes that patient uses for rectal hygiene   [] Fluid restriction before bedtime edu   [x] Genital hygiene/cleaning edu  [x] Edu on water intake for bladder health     Home Exercises Provided and Patient Education Provided     Education provided: See above  Discussed progression of plan of care with patient; educated pt in activity modification; reviewed HEP with pt. Pt demonstrated and verbalized understanding of all instruction and was provided with a handout of HEP (see Patient Instructions).    Written Home Exercises Provided: yes.  Exercises were reviewed and Gunner was able to demonstrate them prior to the end of the session.  Gunner demonstrated good  understanding of the education provided.     See EMR under Patient Instructions for exercises provided 10/10/2023.    Assessment      Reviewed edu on bladder irritants and importance of correct genital hygiene.  Worked on addressing myofascial restrictions in the abdominal wall today including scar tissue.  Initiated core coordination and strengthening.  Pt will continue to benefit from skilled outpatient physical therapy to address the deficits listed in the problem list box on initial evaluation, provide pt/family education and to  maximize pt's level of independence in the home and community environment.       Gunner Is progressing well towards his goals.   Pt prognosis is Excellent.     Pt will continue to benefit from skilled outpatient physical therapy to address the deficits listed in the problem list box on initial evaluation, provide pt/family education and to maximize pt's level of independence in the home and community environment.     Pt's spiritual, cultural and educational needs considered and pt agreeable to plan of care and goals.     Anticipated barriers to physical therapy: work schedule    Goals:   Short Term Goals: 4 weeks   Patient to demonstrate proper use of urge delay strategies to help reduce urge urinary incontinence with activities of daily living.   Pt to demonstrate being able to correctly and consistently perform a kegel which is needed  to increase pelvic floor muscle coordination and strength needed for continence.  Pt to report a decrease in urinary frequency from every 1 hours to no more than once every 3 hour(s) to improve ability to participate in social activities.  Pt to demonstrate independence with performing bowel massage to help with gut motility.   Patient to report no longer needing to strain to urinate 50% of the time to demonstrate a return towards prior level of function.        Long Term Goals: 12 weeks   Pt to be discharged with home plan for carry over after discharge.    Pt to be able to delay the urge to urinate at least 15 minutes with a strong urge to urinate in order to make it to the bathroom without leaking.  Pt to report a decrease in nocturia to no more than 2x/night for improved restorative sleep.    Patient to report no longer needing to strain to urinate 85% of the time to demonstrate a return towards prior level of function.   Pt to report being able to complete a full day at work without significant increase in pain to demonstrate a return towards prior level of function.  Pt to  demonstrate an improved score in the FOTO Urinary Problem survey  to at least 64 to demonstrate improving pelvic floor muscle(s) function with activities of daily living.                PLAN      Plan of care Certification: 10/3/2023 to 12-26-23.     Outpatient Physical Therapy 2 times weekly for 12 weeks to include the following interventions: therapeutic exercises, therapeutic activity, neuromuscular re-education, gait training, manual therapy, modalities PRN, patient/family education, dry needling, and self care/home management    Continue with established Plan of Care, working toward established PT goals.      At next visit: work on pelvic floor muscle(s) downtraining     Nikki Benitez, PT

## 2023-11-07 ENCOUNTER — CLINICAL SUPPORT (OUTPATIENT)
Dept: REHABILITATION | Facility: HOSPITAL | Age: 68
End: 2023-11-07
Payer: MEDICARE

## 2023-11-07 DIAGNOSIS — M62.89 PELVIC FLOOR DYSFUNCTION: ICD-10-CM

## 2023-11-07 DIAGNOSIS — R10.2 PELVIC PAIN: Primary | ICD-10-CM

## 2023-11-07 DIAGNOSIS — M62.838 MUSCLE SPASM: ICD-10-CM

## 2023-11-07 PROCEDURE — 97112 NEUROMUSCULAR REEDUCATION: CPT | Mod: PO

## 2023-11-07 PROCEDURE — 97140 MANUAL THERAPY 1/> REGIONS: CPT | Mod: PO

## 2023-11-07 NOTE — PATIENT INSTRUCTIONS
"Home Exercise Program: 11/07/2023    Abdominal Muscle Training:  Tighten your abdominal muscles without sucking in our pooching your belly.  Tighten and draw in your muscles.  Don't hold your breath!  It actually helps to exhale while your tighten.  "Blow out birthday candles."     Hold 5 seconds.  Repeat 10 times.  2 sets per day.                    Hip Strengthening                                                                    "

## 2023-11-07 NOTE — PROGRESS NOTES
"[facility]   Physical Therapy Treatment Note      Name: Gunner Farr  Clinic Number: [id number]    Therapy Diagnosis:   Encounter Diagnoses   Name Primary?    Pelvic pain Yes    Pelvic floor dysfunction     Muscle spasm        Physician: Horace Winchester MD    Visit Date: 11/7/2023    Physician Orders: PT Eval and Treat   Medical Diagnosis from Referral: voiding dysfunction and chronic pelvic pain  Evaluation Date: 10/3/2023  Authorization Period Expiration: 10/03/2024  Plan of Care Expiration: 12-26-23  Progress Note Due: 11-3-23  Visit # / Visits authorized: 4/ 20    FOTO: 1/3  Date last given: 10/3/2023    Time In: 940  Time Out: 1015  Total Billable Time:  35 minutes    Precautions: Standard    Subjective     Pt reports:  He continues to have pain with dripping urine after he urinates.      Previous visit: He reports he is having less testicle pain and also feels like his stream is starting to improve.  "I've been trying to stay away from coffee and tea, spices and I think that's helping my stream.  I still have a little bit urgency.  I'm trying to stay away from acid stuff."  He reports his rectal itching is improving also.  He continues to report dripping of the urine.      He reports his pain is worse with driving.  Sometimes he feels like the pain is his skin and sometimes it feels like his muscles.  He reports a history of fungal infection.      He reports he still has urgency/frequency symptom(s).        He was compliant with home exercise program.  Response to previous treatment: no issues reported   Functional change: no changes reported    Pain: not assessed  Location: pelvic floor     Constitutional Symptoms Review: The patient denies having any constitutional symptoms.       Objective      Objective Measures updated at progress report unless specified.     Patient verbally consents to intrarectal treatment today.  See EMR under MEDIA for written consent provided " 11/7/2023  Chaperone: declined      RECTAL PELVIC FLOOR EXAM    EXTERNAL ASSESSMENT  Anus: WNL  Skin condition: skin looks within normal limits.  Palpable nodules noted below skin near anus bilaterally.  nb  Scarring: none  Sensation: WNL   Pain:   perianally especially over nodules   Voluntary contraction: visible lift  Voluntary relaxation: visible drop  Involuntary contraction: bulge  Bearing down: visible drop  Discharge: none       INTERNAL ASSESSMENT  EAS tone: WNL   Impaction: none   Pain: tender areas noted as follows: bilateral: coccygeous, obturator internus, levator ani and near prostate (although decreased since initial assessment)  Sensation: WNL   Muscle Bulk: hypertonus (although decreased since initial assessment)  Muscle Power: 3/5    Quality of contraction: slow relaxation and incomplete relaxation   Specificity: WNL  Coordination: WNL         Treatment   Gunnre received the treatments listed below:    Pt verbally consents to intrarectal treatment today.  Signed consent form already on file.       Gunner received therapeutic exercises to develop  flexibility for 00 minutes including:   [] Groin stretch 3x30 sec     Gunner received the following manual therapy techniques: to develop flexibility, extensibility, and desensitization for 12 minutes including:   [] trigger point/myofascial release of intrarectal muscle(s) bilaterally  and soft tissue mobilization of external pelvic floor muscle(s) bilaterally   [x] soft tissue mobilization of abdominal wall muscle(s)  and scar mobilization of abdominal scar tissue in all planes  [] Anterior pelvic floor muscle(s) myofascial release bilaterally   [x] Iliopsoas release bilaterally     Gunner participated in neuromuscular re-education activities to develop Coordination, Control, and Down training for 23 minutes including:   []pelvic floor relaxation/bulging training and pelvic floor relaxation speed after performing a kegel   [x] Pt struggles to isolate  TrA initially but improves with practice. Tactile and verbal cues required for correct activation of TrA. Initially pt substitutes with rectus abdominus muscle but with mod verbal and tactile instruction is able to isolate out  lower abdominal muscles.    [x] Transverse abdominus muscle 5 sec x 10 reps  [x] Transverse abdominus muscle +BKFO/Marching x15 reps.   Cues needed for correct TA activation and coordination with kegel.    [x] Posterior pelvic tilt with cues to not use rectus abdominus x 15 reps          Gunner participated in dynamic functional therapeutic activities to improve functional performance for 00 minutes, including:  [] Plan of care review  [] Anatomy review and discussion of the anatomy on current level of function   [] Home exercise program issued and reviewed   [] Reviewed ointments and pastes that patient uses for rectal hygiene   [] Fluid restriction before bedtime edu   [] Genital hygiene/cleaning edu  [] Edu on water intake for bladder health     Home Exercises Provided and Patient Education Provided     Education provided: See above  Discussed progression of plan of care with patient; educated pt in activity modification; reviewed HEP with pt. Pt demonstrated and verbalized understanding of all instruction and was provided with a handout of HEP (see Patient Instructions).    Written Home Exercises Provided: yes.  Exercises were reviewed and Gunner was able to demonstrate them prior to the end of the session.  Gunner demonstrated good  understanding of the education provided.     See EMR under Patient Instructions for exercises provided 10/10/2023.    Assessment     Worked on addressing myofascial restrictions in the abdominal wall today including scar tissue.  Continued core coordination and strengthening.  Pt will continue to benefit from skilled outpatient physical therapy to address the deficits listed in the problem list box on initial evaluation, provide pt/family education and to  maximize pt's level of independence in the home and community environment.       Gunner Is progressing well towards his goals.   Pt prognosis is Excellent.     Pt will continue to benefit from skilled outpatient physical therapy to address the deficits listed in the problem list box on initial evaluation, provide pt/family education and to maximize pt's level of independence in the home and community environment.     Pt's spiritual, cultural and educational needs considered and pt agreeable to plan of care and goals.     Anticipated barriers to physical therapy: work schedule    Goals:   Short Term Goals: 4 weeks   Patient to demonstrate proper use of urge delay strategies to help reduce urge urinary incontinence with activities of daily living.   Pt to demonstrate being able to correctly and consistently perform a kegel which is needed  to increase pelvic floor muscle coordination and strength needed for continence.  Pt to report a decrease in urinary frequency from every 1 hours to no more than once every 3 hour(s) to improve ability to participate in social activities.  Pt to demonstrate independence with performing bowel massage to help with gut motility.   Patient to report no longer needing to strain to urinate 50% of the time to demonstrate a return towards prior level of function.        Long Term Goals: 12 weeks   Pt to be discharged with home plan for carry over after discharge.    Pt to be able to delay the urge to urinate at least 15 minutes with a strong urge to urinate in order to make it to the bathroom without leaking.  Pt to report a decrease in nocturia to no more than 2x/night for improved restorative sleep.    Patient to report no longer needing to strain to urinate 85% of the time to demonstrate a return towards prior level of function.   Pt to report being able to complete a full day at work without significant increase in pain to demonstrate a return towards prior level of function.  Pt to  demonstrate an improved score in the FOTO Urinary Problem survey  to at least 64 to demonstrate improving pelvic floor muscle(s) function with activities of daily living.                PLAN      Plan of care Certification: 10/3/2023 to 12-26-23.     Outpatient Physical Therapy 2 times weekly for 12 weeks to include the following interventions: therapeutic exercises, therapeutic activity, neuromuscular re-education, gait training, manual therapy, modalities PRN, patient/family education, dry needling, and self care/home management    Continue with established Plan of Care, working toward established PT goals.      At next visit: work on pelvic floor muscle(s) downtraining     Nikki Benitez, PT

## 2023-11-14 ENCOUNTER — CLINICAL SUPPORT (OUTPATIENT)
Dept: REHABILITATION | Facility: HOSPITAL | Age: 68
End: 2023-11-14
Payer: MEDICARE

## 2023-11-14 DIAGNOSIS — M62.89 PELVIC FLOOR DYSFUNCTION: ICD-10-CM

## 2023-11-14 DIAGNOSIS — R10.2 PELVIC PAIN: Primary | ICD-10-CM

## 2023-11-14 DIAGNOSIS — M62.838 MUSCLE SPASM: ICD-10-CM

## 2023-11-14 PROCEDURE — 97112 NEUROMUSCULAR REEDUCATION: CPT | Mod: PO

## 2023-11-14 NOTE — PROGRESS NOTES
"[facility]   Physical Therapy Treatment Note      Name: Gunner Farr  Clinic Number: [id number]    Therapy Diagnosis:   Encounter Diagnoses   Name Primary?    Pelvic pain Yes    Pelvic floor dysfunction     Muscle spasm        Physician: Horace Winchester MD    Visit Date: 11/14/2023    Physician Orders: PT Eval and Treat   Medical Diagnosis from Referral: voiding dysfunction and chronic pelvic pain  Evaluation Date: 10/3/2023  Authorization Period Expiration: 10/03/2024  Plan of Care Expiration: 12-26-23  Progress Note Due: 11-3-23  Visit # / Visits authorized: 4/ 20    FOTO: 1/3  Date last given: 10/3/2023    Time In: 950 (late arrival)  Time Out: 1020  Total Billable Time:  30 minutes    Precautions: Standard    Subjective     Pt reports:  He continues to have pain with dripping urine after he urinates.      Previous visit: He reports he is having less testicle pain and also feels like his stream is starting to improve.  "I've been trying to stay away from coffee and tea, spices and I think that's helping my stream.  I still have a little bit urgency.  I'm trying to stay away from acid stuff."  He reports his rectal itching is improving also.  He continues to report dripping of the urine.      He reports his pain is worse with driving.  Sometimes he feels like the pain is his skin and sometimes it feels like his muscles.  He reports a history of fungal infection.      He reports he still has urgency/frequency symptom(s).        He was compliant with home exercise program.  Response to previous treatment: no issues reported   Functional change: no changes reported    Pain: not assessed  Location: pelvic floor     Constitutional Symptoms Review: The patient denies having any constitutional symptoms.       Objective      Objective Measures updated at progress report unless specified.     Patient verbally consents to intrarectal treatment today.  See EMR under MEDIA for written consent " provided 11/14/2023  Chaperone: declined      RECTAL PELVIC FLOOR EXAM    EXTERNAL ASSESSMENT  Anus: WNL  Skin condition: skin looks within normal limits.  Palpable nodules noted below skin near anus bilaterally.  nb  Scarring: none  Sensation: WNL   Pain:   perianally especially over nodules   Voluntary contraction: visible lift  Voluntary relaxation: visible drop  Involuntary contraction: bulge  Bearing down: visible drop  Discharge: none       INTERNAL ASSESSMENT  EAS tone: WNL   Impaction: none   Pain: tender areas noted as follows: bilateral: coccygeous, obturator internus, levator ani and near prostate (although decreased since initial assessment)  Sensation: WNL   Muscle Bulk: hypertonus (although decreased since initial assessment)  Muscle Power: 3/5    Quality of contraction: slow relaxation and incomplete relaxation   Specificity: WNL  Coordination: WNL         Treatment   Gunner received the treatments listed below:    Pt verbally consents to intrarectal treatment today.  Signed consent form already on file.       Gunner received therapeutic exercises to develop  flexibility for 00 minutes including:   [] Groin stretch 3x30 sec     Gunner received the following manual therapy techniques: to develop flexibility, extensibility, and desensitization for 00 minutes including:   [] trigger point/myofascial release of intrarectal muscle(s) bilaterally  and soft tissue mobilization of external pelvic floor muscle(s) bilaterally   [] soft tissue mobilization of abdominal wall muscle(s)  and scar mobilization of abdominal scar tissue in all planes  [] Anterior pelvic floor muscle(s) myofascial release bilaterally   [] Iliopsoas release bilaterally     Gunner participated in neuromuscular re-education activities to develop Coordination, Control, and Down training for 26 minutes including:   []pelvic floor relaxation/bulging training and pelvic floor relaxation speed after performing a kegel   [x] Pt struggles to  isolate TrA initially but improves with practice. Tactile and verbal cues required for correct activation of TrA. Initially pt substitutes with rectus abdominus muscle but with mod verbal and tactile instruction is able to isolate out  lower abdominal muscles.    [x] Transverse abdominus muscle 5 sec x 10 reps  [x] Transverse abdominus muscle +BKFO/Marching x15 reps.   Cues needed for correct TA activation and coordination with kegel.    [x] Posterior pelvic tilt with cues to not use rectus abdominus x 15 reps          Gunner participated in dynamic functional therapeutic activities to improve functional performance for 4 minutes, including:  [x] Plan of care review  [x] Anatomy review and discussion of the anatomy on current level of function   [] Home exercise program issued and reviewed   [] Reviewed ointments and pastes that patient uses for rectal hygiene   [] Fluid restriction before bedtime edu   [] Genital hygiene/cleaning edu  [] Edu on water intake for bladder health     Home Exercises Provided and Patient Education Provided     Education provided: See above  Discussed progression of plan of care with patient; educated pt in activity modification; reviewed HEP with pt. Pt demonstrated and verbalized understanding of all instruction and was provided with a handout of HEP (see Patient Instructions).    Written Home Exercises Provided: yes.  Exercises were reviewed and Gunner was able to demonstrate them prior to the end of the session.  Gunner demonstrated good  understanding of the education provided.     See EMR under Patient Instructions for exercises provided 10/10/2023.    Assessment     Patient arrived late today so focused on strength and coordination training today.  Pt will continue to benefit from skilled outpatient physical therapy to address the deficits listed in the problem list box on initial evaluation, provide pt/family education and to maximize pt's level of independence in the home and  community environment.       Gunner Is progressing well towards his goals.   Pt prognosis is Excellent.     Pt will continue to benefit from skilled outpatient physical therapy to address the deficits listed in the problem list box on initial evaluation, provide pt/family education and to maximize pt's level of independence in the home and community environment.     Pt's spiritual, cultural and educational needs considered and pt agreeable to plan of care and goals.     Anticipated barriers to physical therapy: work schedule    Goals:   Short Term Goals: 4 weeks   Patient to demonstrate proper use of urge delay strategies to help reduce urge urinary incontinence with activities of daily living.   Pt to demonstrate being able to correctly and consistently perform a kegel which is needed  to increase pelvic floor muscle coordination and strength needed for continence.  Pt to report a decrease in urinary frequency from every 1 hours to no more than once every 3 hour(s) to improve ability to participate in social activities.  Pt to demonstrate independence with performing bowel massage to help with gut motility.   Patient to report no longer needing to strain to urinate 50% of the time to demonstrate a return towards prior level of function.        Long Term Goals: 12 weeks   Pt to be discharged with home plan for carry over after discharge.    Pt to be able to delay the urge to urinate at least 15 minutes with a strong urge to urinate in order to make it to the bathroom without leaking.  Pt to report a decrease in nocturia to no more than 2x/night for improved restorative sleep.    Patient to report no longer needing to strain to urinate 85% of the time to demonstrate a return towards prior level of function.   Pt to report being able to complete a full day at work without significant increase in pain to demonstrate a return towards prior level of function.  Pt to demonstrate an improved score in the FOTO Urinary  Problem survey  to at least 64 to demonstrate improving pelvic floor muscle(s) function with activities of daily living.                PLAN      Plan of care Certification: 10/3/2023 to 12-26-23.     Outpatient Physical Therapy 2 times weekly for 12 weeks to include the following interventions: therapeutic exercises, therapeutic activity, neuromuscular re-education, gait training, manual therapy, modalities PRN, patient/family education, dry needling, and self care/home management    Continue with established Plan of Care, working toward established PT goals.      At next visit: work on pelvic floor muscle(s) downtraining     Nikki Benitez, PT

## 2023-11-16 ENCOUNTER — TELEPHONE (OUTPATIENT)
Dept: UROLOGY | Facility: CLINIC | Age: 68
End: 2023-11-16
Payer: MEDICARE

## 2023-11-16 ENCOUNTER — OFFICE VISIT (OUTPATIENT)
Dept: UROLOGY | Facility: CLINIC | Age: 68
End: 2023-11-16
Payer: MEDICARE

## 2023-11-16 VITALS
SYSTOLIC BLOOD PRESSURE: 123 MMHG | DIASTOLIC BLOOD PRESSURE: 79 MMHG | HEIGHT: 66 IN | HEART RATE: 73 BPM | BODY MASS INDEX: 24.7 KG/M2 | WEIGHT: 153.69 LBS

## 2023-11-16 DIAGNOSIS — R10.2 CHRONIC PELVIC PAIN IN MALE: ICD-10-CM

## 2023-11-16 DIAGNOSIS — N40.1 BPH WITH URINARY OBSTRUCTION: Primary | ICD-10-CM

## 2023-11-16 DIAGNOSIS — N39.8 VOIDING DYSFUNCTION: ICD-10-CM

## 2023-11-16 DIAGNOSIS — G89.29 CHRONIC PELVIC PAIN IN MALE: ICD-10-CM

## 2023-11-16 DIAGNOSIS — N13.8 BPH WITH URINARY OBSTRUCTION: Primary | ICD-10-CM

## 2023-11-16 PROCEDURE — 99999 PR PBB SHADOW E&M-EST. PATIENT-LVL III: ICD-10-PCS | Mod: PBBFAC,,, | Performed by: UROLOGY

## 2023-11-16 PROCEDURE — 3288F PR FALLS RISK ASSESSMENT DOCUMENTED: ICD-10-PCS | Mod: CPTII,S$GLB,, | Performed by: UROLOGY

## 2023-11-16 PROCEDURE — 1126F PR PAIN SEVERITY QUANTIFIED, NO PAIN PRESENT: ICD-10-PCS | Mod: CPTII,S$GLB,, | Performed by: UROLOGY

## 2023-11-16 PROCEDURE — 3008F PR BODY MASS INDEX (BMI) DOCUMENTED: ICD-10-PCS | Mod: CPTII,S$GLB,, | Performed by: UROLOGY

## 2023-11-16 PROCEDURE — 99999 PR PBB SHADOW E&M-EST. PATIENT-LVL III: CPT | Mod: PBBFAC,,, | Performed by: UROLOGY

## 2023-11-16 PROCEDURE — 99214 OFFICE O/P EST MOD 30 MIN: CPT | Mod: S$GLB,,, | Performed by: UROLOGY

## 2023-11-16 PROCEDURE — 1159F MED LIST DOCD IN RCRD: CPT | Mod: CPTII,S$GLB,, | Performed by: UROLOGY

## 2023-11-16 PROCEDURE — 99214 PR OFFICE/OUTPT VISIT, EST, LEVL IV, 30-39 MIN: ICD-10-PCS | Mod: S$GLB,,, | Performed by: UROLOGY

## 2023-11-16 PROCEDURE — 3078F PR MOST RECENT DIASTOLIC BLOOD PRESSURE < 80 MM HG: ICD-10-PCS | Mod: CPTII,S$GLB,, | Performed by: UROLOGY

## 2023-11-16 PROCEDURE — 1126F AMNT PAIN NOTED NONE PRSNT: CPT | Mod: CPTII,S$GLB,, | Performed by: UROLOGY

## 2023-11-16 PROCEDURE — 3074F SYST BP LT 130 MM HG: CPT | Mod: CPTII,S$GLB,, | Performed by: UROLOGY

## 2023-11-16 PROCEDURE — 1101F PR PT FALLS ASSESS DOC 0-1 FALLS W/OUT INJ PAST YR: ICD-10-PCS | Mod: CPTII,S$GLB,, | Performed by: UROLOGY

## 2023-11-16 PROCEDURE — 1101F PT FALLS ASSESS-DOCD LE1/YR: CPT | Mod: CPTII,S$GLB,, | Performed by: UROLOGY

## 2023-11-16 PROCEDURE — 3008F BODY MASS INDEX DOCD: CPT | Mod: CPTII,S$GLB,, | Performed by: UROLOGY

## 2023-11-16 PROCEDURE — 3074F PR MOST RECENT SYSTOLIC BLOOD PRESSURE < 130 MM HG: ICD-10-PCS | Mod: CPTII,S$GLB,, | Performed by: UROLOGY

## 2023-11-16 PROCEDURE — 3288F FALL RISK ASSESSMENT DOCD: CPT | Mod: CPTII,S$GLB,, | Performed by: UROLOGY

## 2023-11-16 PROCEDURE — 1159F PR MEDICATION LIST DOCUMENTED IN MEDICAL RECORD: ICD-10-PCS | Mod: CPTII,S$GLB,, | Performed by: UROLOGY

## 2023-11-16 PROCEDURE — 3078F DIAST BP <80 MM HG: CPT | Mod: CPTII,S$GLB,, | Performed by: UROLOGY

## 2023-11-16 NOTE — TELEPHONE ENCOUNTER
----- Message from Reji Zaidi sent at 11/16/2023 11:16 AM CST -----  Type:  late    Who Called:Pt  Does the patient know what this is regarding?: running late (20 mins)  Would the patient rather a call back or a response via [employer]? call  Best Call Back Number:  [phone]  Additional Information:

## 2023-11-16 NOTE — PROGRESS NOTES
[facility] - [facility]rology   Clinic Note    SUBJECTIVE:     Chief Complaint   Patient presents with    Benign Prostatic Hypertrophy       Referral from: No ref. provider found.    History of Present Illness:  Gunner Farr is a 68 y.o. male who presents to clinic for BPH.    [facility].  The patient desires either Rezum or HoLEP which [facility] not [facility].  He reports very bothersome urgency frequency and nocturia.  Does have a history of prostatitis and has been treated with antibiotics.  Additionally reports he had elevated PSA and had an MRI negative biopsy.  He did not bring these records with him and they were not faxed over.  Has such significantly bothered symptoms that he desires intervention.  He is interested in an intervention that could be ejaculation sparing.  He does report good sexual function with erections and ejaculations in his interested in preserving this.    02/09/2023  Patient is here for follow-up.  He brings his records with him which reveals an MRI that was performed that showed that he had a 33 cc prostate.  He had a cystoscopy performed today which revealed bilateral lobar hypertrophy and no median lobe.  He does continue to have some dysuria.  Strongly desires intervention.    03/22/2023  Patient underwent resume on February 28, 2023.  He reports since then he has noticed an improvement in his voiding symptoms.  Has had intermittent hematuria and dysuria.  He does feel like there has been a slight improvement in his symptoms however not quite where he would like to be yet.  He has not had sexual intercourse since the procedure.    06/26/2023  Here for follow-up.  He does continue to report a mild pelvic discomfort however he reports his symptoms from a urination standpoint are significantly improved from preoperatively.  Does report he has a strong stream.  Reports minimal straining.  He does report that he still has some urgency and  frequency and nocturia 3 times per night.  Did fill out an AUA symptom score which shows a score of 21 and bother score of 4 however upon questioning he does not seem to have symptoms this severe.  He reports he is not taking any prostate medication at this time.    IPSS Questionnaire (AUA-SS):  Over the past month    1)  Incomplete Emptying - How often have you had a sensation of not emptying your bladder completely after you finish urinating?  3 - About half the time   2)  Frequency - How often have you had to urinate again less than two hours after you finished urinating? 3 - About half the time   3)  Intermittency - How often have you found you stopped and started again several times when you urinated?  3 - About half the time   4) Urgency - How difficult have you found it to postpone urination?  3 - About half the time   5) Weak Stream - How often have you had a weak urinary stream?  2 - Less than half the time   6) Straining  - How often have you had to push or strain to begin urination?  4 - More than half the time   7) Nocturia - How many times did you most typically get up to urinate from the time you went to bed until the time you got up in the morning?  3 - 3 times   Total score:  0-7 mild, 8-19 moderate, 20-35 severe 21   Quality of Life:  4 - Mostly Dissatisfied     Uroflow:  Qmax: 19.5 mL/sec  Qav.9 mL/sec  Voided: 184 mL  PVR: 0 mL     2023  Here for follow-up.  Reports he has been doing very well.  His pain is improved with physical therapy.  He has been taking oxybutynin without any bothersome side effects and reports good improvement in his urgency and frequency.  His flow is great.  Denies any sexual dysfunction.    Patient endorses no additional complaints at this time.    Past Medical History:   Diagnosis Date    GERD (gastroesophageal reflux disease)     Hypertension     Prostate disease        Past Surgical History:   Procedure Laterality Date    APPENDECTOMY      CYSTOSCOPY WITH  TRANSURETHRAL DESTRUCTION OF PROSTATE,RFA N/A 2/28/2023    Procedure: CYSTOSCOPY WITH TRANSURETHRAL DESTRUCTION OF PROSTATE,RFA;  Surgeon: Horace Winchseter MD;  Location: [facility];  Service: Urology;  Laterality: N/A;       Family History   Problem Relation Age of Onset    Lung disease Mother     Prostatitis Father        Social History     Tobacco Use    Smoking status: Never    Smokeless tobacco: Never   Substance Use Topics    Alcohol use: Yes     Comment: occassional    Drug use: No       Current Outpatient Medications on File Prior to Visit   Medication Sig Dispense Refill    atenolol (TENORMIN) 50 MG tablet Take 50 mg by mouth once daily.      methocarbamol (ROBAXIN) 750 MG Tab Take 500 mg by mouth 4 (four) times daily.      morphine (MSIR) 15 MG tablet Take 1 tablet (15 mg total) by mouth every 12 (twelve) hours as needed for Pain (break through pain). 6 tablet 0    oxybutynin (DITROPAN-XL) 10 MG 24 hr tablet Take 1 tablet (10 mg total) by mouth once daily. 30 tablet 11    oxyCODONE-acetaminophen (PERCOCET) 5-325 mg per tablet Take 1 tablet by mouth every 4 (four) hours as needed for Pain. 12 tablet 0    traMADol (ULTRAM) 50 mg tablet Take 1 tablet (50 mg total) by mouth every 4 (four) hours as needed for Pain. 30 tablet 0    diazepam (VALIUM) 5 MG tablet Take 1 tablet (5 mg total) by mouth every 8 (eight) hours. 15 tablet 0    fluticasone (FLONASE) 50 mcg/actuation nasal spray 1 spray by Each Nare route 2 (two) times daily as needed for Rhinitis. (Patient not taking: Reported on 2/9/2023) 15 g 0    loratadine (CLARITIN) 10 mg tablet Take 1 tablet (10 mg total) by mouth once daily. 30 tablet 2    meloxicam (MOBIC) 7.5 MG tablet Take 1 tablet (7.5 mg total) by mouth once daily. (Patient not taking: Reported on 2/4/2020) 7 tablet 0    omeprazole (PRILOSEC) 20 MG capsule Take 1 capsule (20 mg total) by mouth once daily. 30 capsule 0     No current facility-administered medications on file prior to visit.  "      Review of patient's allergies indicates:   Allergen Reactions    Hydrocodone-acetaminophen Shortness Of Breath     Shortness of breath^  Shortness of breath^      Caffeine      Anxiety^difficulty breathing  Anxiety^difficulty breathing         Review of Systems:  A review of 10+ systems was conducted with pertinent positive and negative findings documented in HPI with all other systems reviewed and negative.    OBJECTIVE:     Estimated body mass index is 24.8 kg/m² as calculated from the following:    Height as of this encounter: 5' 6" (1.676 m).    Weight as of this encounter: 69.7 kg (153 lb 10.6 oz).    Vital Signs (Most Recent)  Vitals:    11/16/23 1140   BP: 123/79   Pulse: 73       Physical Exam:  GENERAL: patient sitting comfortably  HEENT: normocephalic  NECK: supple, no JVD  PULM: normal chest rise, no increased WOB  HEART: non-diaphoretic  ABDO: soft, nondistended, nontender  BACK: no CVA tenderness bilaterally  SKIN: warm, dry, well perfused  EXT: no bruising or edema  NEURO: grossly normal with no focal deficits  PSYCH: appropriate mood and affect    Genitourinary Exam:  deferred    Lab Results   Component Value Date    BUN 17 02/28/2023    CREATININE 1.1 02/28/2023    WBC 9.59 12/25/2013    HGB 14.9 12/25/2013    HCT 42.2 12/25/2013     12/25/2013    AST 21 12/25/2013    ALT 19 12/25/2013    ALKPHOS 73 12/25/2013    ALBUMIN 3.9 12/25/2013    INR 1.0 12/25/2013        Imaging:  I have personally reviewed all relevant imaging studies.    No results found for this or any previous visit (from the past 2160 hour(s)).  No results found for this or any previous visit (from the past 2160 hour(s)).  X-Ray Hand 2 View Left  Narrative: EXAMINATION:  XR HAND 2 VIEW LEFT    CLINICAL HISTORY:  Fracture of unspecified phalanx of left ring finger, initial encounter for open fracture    TECHNIQUE:  XR HAND 2 VIEW LEFT    COMPARISON:  11/24/2019    FINDINGS:  Compared with the prior study there has been no " "evidence of interval healing the nondisplaced fracture of the distal tuft of the distal phalanx of the 4th digit.  The fracture margins are smoother and the overlying soft tissue injury has healed.  Impression: No evidence of healing of nondisplaced fracture of the distal tuft of the 4th phalanx    Electronically signed by: Jaron Holden  Date:    01/07/2020  Time:    11:43       PSA:  No results found for: "PSA", "PSADIAG", "PSATOTAL", "PSAFREE"    Testosterone:  No results found for: "TOTALTESTOST", "TESTOSTERONE"     ASSESSMENT     1. BPH with urinary obstruction    2. Voiding dysfunction    3. Chronic pelvic pain in male        PLAN:     BPH with urinary obstruction s/p ReZum  Voiding dysfunction  Chronic pelvic pain    - doing very well postoperatively.  No complaints at this time.  Much improved with oxybutynin and pelvic floor physical therapy.  Continue current therapy.    Horace Winchester MD  Urology  [facility]    Disclaimer: This note has been generated using voice-recognition software. There may be typographical errors that have been missed during proof-reading.           "

## 2023-11-21 ENCOUNTER — CLINICAL SUPPORT (OUTPATIENT)
Dept: REHABILITATION | Facility: HOSPITAL | Age: 68
End: 2023-11-21
Payer: MEDICARE

## 2023-11-21 DIAGNOSIS — M62.89 PELVIC FLOOR DYSFUNCTION: ICD-10-CM

## 2023-11-21 DIAGNOSIS — M62.838 MUSCLE SPASM: ICD-10-CM

## 2023-11-21 DIAGNOSIS — R10.2 PELVIC PAIN: Primary | ICD-10-CM

## 2023-11-21 PROCEDURE — 97140 MANUAL THERAPY 1/> REGIONS: CPT | Mod: PO

## 2023-11-21 PROCEDURE — 97112 NEUROMUSCULAR REEDUCATION: CPT | Mod: PO

## 2023-11-21 NOTE — PROGRESS NOTES
"[facility]   Physical Therapy Treatment Note      Name: Gunner Farr  Clinic Number: [id number]    Therapy Diagnosis:   Encounter Diagnoses   Name Primary?    Pelvic pain Yes    Pelvic floor dysfunction     Muscle spasm        Physician: Horace Winchester MD    Visit Date: 11/21/2023    Physician Orders: PT Eval and Treat   Medical Diagnosis from Referral: voiding dysfunction and chronic pelvic pain  Evaluation Date: 10/3/2023  Authorization Period Expiration: 10/03/2024  Plan of Care Expiration: 12-26-23  Progress Note Due: 11-3-23  Visit # / Visits authorized: 7/ 20    FOTO: 2/3  Date last given: 11/14/2023    Time In: 955 (late arrival)  Time Out: 1042  Total Billable Time:  48 minutes    Precautions: Standard    Subjective     Pt reports:  He continues to have pain with dripping urine after he urinates.      Previous visit: He reports he is having less testicle pain and also feels like his stream is starting to improve.  "I've been trying to stay away from coffee and tea, spices and I think that's helping my stream.  I still have a little bit urgency.  I'm trying to stay away from acid stuff."  He reports his rectal itching is improving also.  He continues to report dripping of the urine.      He reports his pain is worse with driving.  Sometimes he feels like the pain is his skin and sometimes it feels like his muscles.  He reports a history of fungal infection.      He reports he still has urgency/frequency symptom(s).        He was compliant with home exercise program.  Response to previous treatment: no issues reported   Functional change: no changes reported    Pain: not assessed  Location: pelvic floor     Constitutional Symptoms Review: The patient denies having any constitutional symptoms.       Objective      Objective Measures updated at progress report unless specified.     Patient verbally consents to intrarectal treatment today.  See EMR under MEDIA for written consent " provided 11/21/2023  Chaperone: declined      RECTAL PELVIC FLOOR EXAM    EXTERNAL ASSESSMENT  Anus: WNL  Skin condition: skin looks within normal limits.  Palpable nodules noted below skin near anus bilaterally.  nb  Scarring: none  Sensation: WNL   Pain:   perianally especially over nodules   Voluntary contraction: visible lift  Voluntary relaxation: visible drop  Involuntary contraction: bulge  Bearing down: visible drop  Discharge: none       INTERNAL ASSESSMENT  EAS tone: WNL   Impaction: none   Pain: tender areas noted as follows: bilateral: coccygeous, obturator internus, levator ani and near prostate (although decreased since initial assessment)  Sensation: WNL   Muscle Bulk: hypertonus (although decreased since initial assessment)  Muscle Power: 3/5    Quality of contraction: slow relaxation and incomplete relaxation   Specificity: WNL  Coordination: WNL         Treatment   Gunner received the treatments listed below:    Pt verbally consents to intrarectal treatment today.  Signed consent form already on file.       Gunner received therapeutic exercises to develop  flexibility for 00 minutes including:   [] Groin stretch 3x30 sec     Gunner received the following manual therapy techniques: to develop flexibility, extensibility, and desensitization for 10  minutes including:   [] trigger point/myofascial release of intrarectal muscle(s) bilaterally  and soft tissue mobilization of external pelvic floor muscle(s) bilaterally   [x] soft tissue mobilization of abdominal wall muscle(s)  and scar mobilization of abdominal scar tissue in all planes  [] Anterior pelvic floor muscle(s) myofascial release bilaterally   [x] Iliopsoas release bilaterally     Gunner participated in neuromuscular re-education activities to develop Coordination, Control, and Down training for 26 minutes including:   []pelvic floor relaxation/bulging training and pelvic floor relaxation speed after performing a kegel   [x] Pt struggles  to isolate TrA initially but improves with practice. Tactile and verbal cues required for correct activation of TrA. Initially pt substitutes with rectus abdominus muscle but with mod verbal and tactile instruction is able to isolate out  lower abdominal muscles.    [x] Transverse abdominus muscle 5 sec x 10 reps  [x] Transverse abdominus muscle +BKFO/Marching 5 sec x 10 reps x 2 sets reps.   Cues needed for correct TA activation and coordination with kegel.    [x] Posterior pelvic tilt with cues to not use rectus abdominus 5 sec x 20 reps  [x] Firehydrant x 10 reps bilaterally  [x] Quadruped hip ext x 10 reps bilaterally             Gunner participated in dynamic functional therapeutic activities to improve functional performance for 4 minutes, including:  [x] Plan of care review  [x] Anatomy review and discussion of the anatomy on current level of function   [] Home exercise program issued and reviewed   [] Reviewed ointments and pastes that patient uses for rectal hygiene   [] Fluid restriction before bedtime edu   [] Genital hygiene/cleaning edu  [] Edu on water intake for bladder health     Home Exercises Provided and Patient Education Provided     Education provided: See above  Discussed progression of plan of care with patient; educated pt in activity modification; reviewed HEP with pt. Pt demonstrated and verbalized understanding of all instruction and was provided with a handout of HEP (see Patient Instructions).    Written Home Exercises Provided: yes.  Exercises were reviewed and Gunner was able to demonstrate them prior to the end of the session.  Gunner demonstrated good  understanding of the education provided.     See EMR under Patient Instructions for exercises provided 10/10/2023.    Assessment     Patient arrived late today so focused on strength and coordination training today. Progressed reps today.  Also worked on abdominal wall mobilization to help decrease strain towards pelvic structures.    Pt will continue to benefit from skilled outpatient physical therapy to address the deficits listed in the problem list box on initial evaluation, provide pt/family education and to maximize pt's level of independence in the home and community environment.       Gunner Is progressing well towards his goals.   Pt prognosis is Excellent.     Pt will continue to benefit from skilled outpatient physical therapy to address the deficits listed in the problem list box on initial evaluation, provide pt/family education and to maximize pt's level of independence in the home and community environment.     Pt's spiritual, cultural and educational needs considered and pt agreeable to plan of care and goals.     Anticipated barriers to physical therapy: work schedule    Goals:   Short Term Goals: 4 weeks   Patient to demonstrate proper use of urge delay strategies to help reduce urge urinary incontinence with activities of daily living.   Pt to demonstrate being able to correctly and consistently perform a kegel which is needed  to increase pelvic floor muscle coordination and strength needed for continence.  Pt to report a decrease in urinary frequency from every 1 hours to no more than once every 3 hour(s) to improve ability to participate in social activities.  Pt to demonstrate independence with performing bowel massage to help with gut motility.   Patient to report no longer needing to strain to urinate 50% of the time to demonstrate a return towards prior level of function.        Long Term Goals: 12 weeks   Pt to be discharged with home plan for carry over after discharge.    Pt to be able to delay the urge to urinate at least 15 minutes with a strong urge to urinate in order to make it to the bathroom without leaking.  Pt to report a decrease in nocturia to no more than 2x/night for improved restorative sleep.    Patient to report no longer needing to strain to urinate 85% of the time to demonstrate a return towards  prior level of function.   Pt to report being able to complete a full day at work without significant increase in pain to demonstrate a return towards prior level of function.  Pt to demonstrate an improved score in the FOTO Urinary Problem survey  to at least 64 to demonstrate improving pelvic floor muscle(s) function with activities of daily living.                PLAN      Plan of care Certification: 10/3/2023 to 12-26-23.     Outpatient Physical Therapy 2 times weekly for 12 weeks to include the following interventions: therapeutic exercises, therapeutic activity, neuromuscular re-education, gait training, manual therapy, modalities PRN, patient/family education, dry needling, and self care/home management    Continue with established Plan of Care, working toward established PT goals.      At next visit: work on pelvic floor muscle(s) downtraining     Nikki Benitez, PT

## 2023-12-12 ENCOUNTER — CLINICAL SUPPORT (OUTPATIENT)
Dept: REHABILITATION | Facility: HOSPITAL | Age: 68
End: 2023-12-12
Payer: MEDICARE

## 2023-12-12 DIAGNOSIS — M62.838 MUSCLE SPASM: ICD-10-CM

## 2023-12-12 DIAGNOSIS — R10.2 PELVIC PAIN: Primary | ICD-10-CM

## 2023-12-12 DIAGNOSIS — M62.89 PELVIC FLOOR DYSFUNCTION: ICD-10-CM

## 2023-12-12 PROCEDURE — 97530 THERAPEUTIC ACTIVITIES: CPT

## 2023-12-12 NOTE — PROGRESS NOTES
"[facility]   Physical Therapy Treatment and Discharge Note      Name: Gunner Farr  Clinic Number: [id number]    Therapy Diagnosis:   Encounter Diagnoses   Name Primary?    Pelvic pain Yes    Pelvic floor dysfunction     Muscle spasm        Physician: Horace Winchester MD    Visit Date: 12/12/2023    Physician Orders: PT Eval and Treat   Medical Diagnosis from Referral: voiding dysfunction and chronic pelvic pain  Evaluation Date: 10/3/2023  Authorization Period Expiration: 10/03/2024  Plan of Care Expiration: 12-26-23  Visit # / Visits authorized: 8/ 20    FOTO: 3/3      Time In: 950 (late arrival)  Time Out: 1013  Total Billable Time:  23 minutes    Precautions: Standard    Subjective     Pt reports:   Patient reports he has had hiccups since last night.  Feels like his urinary hesitancy has increased over the past 3-4 days.  Continues to report no testicular pain.  He reports no difficulty with passing BM.  He continues to have pain with dripping urine after he urinates.  "I can lift heavy things and I don't have pain."  Reports nocturia has decreased to on average 1-2x.  He feels like the therapy has really helped to decrease pain and urinary symptom(s).            He was compliant with home exercise program.  Response to previous treatment: no issues reported   Functional change: no changes reported    Pain: not assessed  Location: pelvic floor     Constitutional Symptoms Review: The patient denies having any constitutional symptoms.       Objective      Objective Measures updated at progress report unless specified.       Treatment   Gunner received the treatments listed below:          Gunner participated in dynamic functional therapeutic activities to improve functional performance for 23 minutes, including:  [x] Detailed discharge planning  [x] Anatomy review and discussion of the anatomy on current level of function   [x] Home exercise program reviewed   [x] Review of FOTO to " discusses functional changes and successes with therapy       Home Exercises Provided and Patient Education Provided     Education provided: See above  Discussed progression of plan of care with patient; educated pt in activity modification; reviewed HEP with pt. Pt demonstrated and verbalized understanding of all instruction and was provided with a handout of HEP (see Patient Instructions).    Written Home Exercises Provided: yes.  Exercises were reviewed and Gunner was able to demonstrate them prior to the end of the session.  Gunner demonstrated good  understanding of the education provided.     See EMR under Patient Instructions for exercises provided at previous visits     Assessment     Gunner has made excellent progress with participation in the POC towards reduction of pelvic pain and urinary symptom(s).  He has met the majority of his goals and is independent with his home program.  At this time Gunner no longer requires skilled intervention and is appropriate for discharge.       Discharge reason: Patient has met all of his/her goals and Patient has reached the maximum rehab potential for the present time    Discharge FOTO Score:             Goals:   Short Term Goals: 4 weeks   Patient to demonstrate proper use of urge delay strategies to help reduce urge urinary incontinence with activities of daily living.  Met  Pt to demonstrate being able to correctly and consistently perform a kegel which is needed  to increase pelvic floor muscle coordination and strength needed for continence. Met  Pt to report a decrease in urinary frequency from every 1 hours to no more than once every 3 hour(s) to improve ability to participate in social activities. Met  Pt to demonstrate independence with performing bowel massage to help with gut motility. Met  Patient to report no longer needing to strain to urinate 50% of the time to demonstrate a return towards prior level of function.Met        Long Term Goals: 12 weeks  Met  Pt to be discharged with home plan for carry over after discharge.  Met  Pt to be able to delay the urge to urinate at least 15 minutes with a strong urge to urinate in order to make it to the bathroom without leaking. Met  Pt to report a decrease in nocturia to no more than 2x/night for improved restorative sleep.  Met  Patient to report no longer needing to strain to urinate 85% of the time to demonstrate a return towards prior level of function. Met  Pt to report being able to complete a full day at work without significant increase in pain to demonstrate a return towards prior level of function. Met  Pt to demonstrate an improved score in the FOTO Urinary Problem survey  to at least 64 to demonstrate improving pelvic floor muscle(s) function with activities of daily living.  Met              PLAN      This patient is discharged from physical therapy.         Nikki Benitez, PT
